# Patient Record
Sex: FEMALE | Race: WHITE
[De-identification: names, ages, dates, MRNs, and addresses within clinical notes are randomized per-mention and may not be internally consistent; named-entity substitution may affect disease eponyms.]

---

## 2020-03-25 ENCOUNTER — HOSPITAL ENCOUNTER (INPATIENT)
Dept: HOSPITAL 97 - L&D | Age: 30
LOS: 2 days | Discharge: HOME | End: 2020-03-27
Attending: SPECIALIST | Admitting: SPECIALIST
Payer: COMMERCIAL

## 2020-03-25 VITALS — BODY MASS INDEX: 28.3 KG/M2

## 2020-03-25 DIAGNOSIS — D64.9: ICD-10-CM

## 2020-03-25 DIAGNOSIS — Z3A.38: ICD-10-CM

## 2020-03-25 LAB
HCT VFR BLD CALC: 29.9 % (ref 36–45)
LYMPHOCYTES # SPEC AUTO: 2.2 K/UL (ref 0.7–4.9)
METHAMPHET UR QL SCN: NEGATIVE
PMV BLD: 10.5 FL (ref 7.6–11.3)
RBC # BLD: 3.25 M/UL (ref 3.86–4.86)
THC SERPL-MCNC: POSITIVE NG/ML

## 2020-03-25 PROCEDURE — 85025 COMPLETE CBC W/AUTO DIFF WBC: CPT

## 2020-03-25 PROCEDURE — 86870 RBC ANTIBODY IDENTIFICATION: CPT

## 2020-03-25 PROCEDURE — 36415 COLL VENOUS BLD VENIPUNCTURE: CPT

## 2020-03-25 PROCEDURE — 86850 RBC ANTIBODY SCREEN: CPT

## 2020-03-25 PROCEDURE — 0KQM0ZZ REPAIR PERINEUM MUSCLE, OPEN APPROACH: ICD-10-PCS

## 2020-03-25 PROCEDURE — 85014 HEMATOCRIT: CPT

## 2020-03-25 PROCEDURE — 86901 BLOOD TYPING SEROLOGIC RH(D): CPT

## 2020-03-25 PROCEDURE — 80307 DRUG TEST PRSMV CHEM ANLYZR: CPT

## 2020-03-25 PROCEDURE — 86592 SYPHILIS TEST NON-TREP QUAL: CPT

## 2020-03-25 PROCEDURE — 87340 HEPATITIS B SURFACE AG IA: CPT

## 2020-03-25 RX ADMIN — IBUPROFEN PRN MG: 600 TABLET ORAL at 20:30

## 2020-03-25 RX ADMIN — IBUPROFEN PRN MG: 600 TABLET ORAL at 13:45
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
## 2020-03-25 NOTE — XMS REPORT
Summary of Care

 Created on:2020



Patient:Mellissa Tobar

Sex:Female

:1990

External Reference #:[id number]





Demographics







 Address  [address] .



   [place] [address]

 

 Mobile Phone  [phone]

 

 Home Phone  [phone]

 

 Phone  [phone]

 

 Email Address  [email or web address]

 

 Email Address  [email or web address]

 

 Preferred Language  English

 

 Marital Status  

 

 Baptism Affiliation  Unknown

 

 Race  White

 

 Ethnic Group  Not  or 









Author







 Organization  [facility]

 

 Address  [address] [place] [address]









Support







 Name  Relationship  Address  Phone

 

 Gerhard Tobar  Unavailable  [address]   [phone]



     [place] [address]  

 

 Cruz Contreras  Unavailable  [address]  [phone]



     [place] [address]  









Care Team Providers







 Name  Role  Phone

 

 Doctor Unassigned, No Name  Medicaid Hmo  Unavailable

 

 Ernestina, [facility]ish [facility]  Unavailable  Unavailable

 

 Jayy Price [facility]  Unavailable  [phone]

 

 Ami Alcala  Primary Care Provider  [phone]









Reason for Visit







 Reason  Comments

 

 Prescription  







Encounter Details







 Date  Type  Department  Care Team  Description

 

 2020  Telephone  [facility]



  Prescription



     [address]



  [address]



  



     [place] [address]



  [facility]



  



     [phone]  [place] [address] [phone] (Fax)  







Allergies

No Known Allergiesdocumented as of this encounter (statuses as of 2020)



Medications







 Medication  Sig  Dispensed  Refills  Start Date  End Date  Status

 

 PNV 67-iron ps-folate  Take 1 capsule by  30 capsule  11  2019    Active



 no.1-dha (VITAFOL  mouth daily.          



 ULTRA) 29 mg iron- 1            



 mg-200 mg            



 CapIndications: High            



 risk pregnancy,            



 antepartum            

 

 proMETHazine 25 mg  Take 1 tablet by  30 tablet  3  2019    Active



 tabletIndications:  mouth every 4          



 Nausea/vomiting in  (four) hours as          



 pregnancy  needed for Nausea          



   and Vomiting          



   (N/V).          

 

 HYDROXYprogest,PF,,pre      0  2019    Active



 g presv, 250 mg/mL (1            



 mL) injection            









 Hospital, Clinic, or Other  Ordered Dose  Route  Frequency  Start Date  End 
Date  Status



 Facility Administered            



 Medication            

 

 HYDROXYprogest(PF)(preg  250 mg  IM  QWEEKLY  2019  Active



 presv) (JAYNA) 250 mg/mL            



 (1 mL) injection 250 mg            



documented as of this encounter (statuses as of 2020)



Active Problems







 Problem  Noted Date

 

 Rh negative, antepartum  10/23/2019

 

 History of fetal anomaly in prior pregnancy, currently pregnant  08/15/2019

 

 High risk pregnancy, antepartum  2017

 

 Current pregnancy with history of pre-term labor in second trimester  2017

 

 H/O cervical incompetence  2017

 

 Recurrent pregnancy loss, antepartum condition or complication  2017

 

 History of PID  2017

 

 Fetal demise, less than 22 weeks  2014









 Pregnant  Estimated Date of Delivery  Comments

 

 Yes  2020  Based on last menstrual period of 2019



     (Approximate)



documented as of this encounter (statuses as of 2020)



Resolved Problems







 Problem  Noted Date  Resolved Date

 

 H/O  delivery, currently pregnant, second trimester  10/10/2019  12/30/
2019

 

 Supervision of pregnancy with other poor reproductive or  2017  08/15/
2019



 obstetric history, third trimester    

 

 Preg care for patient w recurrent preg loss, third trimester  2017  08/15
/2019

 

 Suprvsn of preg w history of pre-term labor, third trimester  2017  08/15
/2019

 

 Rh negative state in antepartum period  2017  08/15/2019

 

 Rh negative state in antepartum period, first trimester  2017

 

 Well woman exam with routine gynecological exam  2017

 

 Screening for STD (sexually transmitted disease)  2017

 

 History of anemia  2017  08/15/2019

 

 Other general counseling and advice for contraceptive  2017



 management    

 

 Threatened  in early pregnancy  2015

 

 Unsure of last menstrual period as reason for ultrasound  2015



 scan    

 

 History of cervical incompetence in pregnancy, currently  2015



 pregnant, first trimester    

 

 Rh negative state in antepartum period, first trimester,  2015



 fetus 1    

 

 Pregnancy with other poor reproductive history  07/10/2014  2017

 

 High-risk pregnancy  07/10/2014  2017

 

 History of  delivery, currently pregnant  2014









 Overview: 







 Delivered at 21 weeks









 Rh negative status during pregnancy, antepartum  2014



documented as of this encounter (statuses as of 2020)



Immunizations







 Name  Administration Dates  Next Due

 

 Rho (d) Immune Globulin  2020, 2019, 08/10/2017  

 

 TDAP (ADACEL) VACCINE  2020  

 

 Td  2010  



documented as of this encounter



Social History







 Tobacco Use  Types  Packs/Day  Years Used  Date

 

 Former Smoker  Cigarettes  0.1  1  Quit: 2014









 Smokeless Tobacco: Never Used      









 Alcohol Use  Drinks/Week  oz/Week  Comments

 

 No  0 Standard drinks or equivalent  0.0  









 Pregnant  Estimated Date of Delivery  Comments

 

 Yes  2020  Based on last menstrual period of 2019



     (Approximate)









 Sex Assigned at Birth  Date Recorded

 

 Not on file  









 Job Start Date  Occupation  Industry

 

 Not on file  Not on file  Not on file









 Travel History  Travel Start  Travel End









 No recent travel history available.



documented as of this encounter



Last Filed Vital Signs

Not on filedocumented in this encounter



Plan of Treatment







 Date  Type  Specialty  Care Team  Description

 

 2020  Routine Prenatal Visit  [facility]  Ami Alcala, FNP



  



       [address]



  



       [place] [address]



  



       [phone] (Fax)  









 Health Maintenance  Due Date  Last Done  Comments

 

 INFLUENZA VACCINE (#1)  2020    Postponed from



       2019 (Refused)

 

 PAP SMEAR  08/15/2022  08/15/2019, 2017,  



     2014, Additional  



     history exists  

 

 DTaP,Tdap,and Td Vaccines  2030, 2010  



 (2 - Td)      

 

 PNEUMOCOCCAL 0-64 YEARS  Aged Out    No longer eligible



 COMBINED SERIES      based on patient's age



       to complete this topic



documented as of this encounter



Results

Not on filedocumented in this encounter



Insurance







 Payer  Benefit Plan /  Subscriber ID  Effective  Phone  Address  Type



   Group    [facility]  xxxxxxxxx  10/1/2019-Pres    [address]  Medicaid



 HEALTH CHOICE -  HEALTH CHOICE    ent    [id number]



  



 MANAGED  MEDICAID        [place]          [id number]  



documented as of this encounter



Advance Directives







 Name  Relationship  Healthcare Agent Relationship  Communication

 

 Gerhard Harrell  Primary healthcare agent  [phone]



       (Mobile)[phone] (Home)

## 2020-03-25 NOTE — XMS REPORT
Summary of Care

 Created on:2019



Patient:Mellissa Tobar

Sex:Female

:1990

External Reference #:[id number]





Demographics







 Address  [address] .



   [place] [address]

 

 Mobile Phone  [phone]

 

 Home Phone  [phone]

 

 Phone  [phone]

 

 Email Address  [email or web address]

 

 Email Address  [email or web address]

 

 Preferred Language  English

 

 Marital Status  

 

 Nondenominational Affiliation  Unknown

 

 Race  White

 

 Ethnic Group  Not  or 









Author







 Organization  [facility]

 

 Address  [address] [place] [address]









Support







 Name  Relationship  Address  Phone

 

 Gerhard Tobar  Unavailable  [address]   [phone]



     [place] [address]  

 

 Cruz Contreras  Unavailable  [address]  [phone]



     [place] [address]  









Care Team Providers







 Name  Role  Phone

 

 Doctor Unassigned, No Name  Medicaid Hmo  Unavailable

 

 Faculty, Ashish [facility]  Unavailable  Unavailable

 

 Jayy Price FNP  Unavailable  [phone]

 

 Ami Alcala  Primary Care Provider  [phone]









Reason for Visit







 Reason  Comments

 

 PREGNANCY ULTRASOUND  



 (Routine)





 Status  Reason  Specialty  Diagnoses /  Referred By  Referred To



       Procedures  Contact  Contact

 

 Authorized    Maternal Fetal  Diagnoses



High risk pregnancy, antepartum  Ami Alcala  



     Medicine  



Procedures



CONSULT MATERNAL FETAL MEDICINE ULTRASOUND Preferred Location: [facility]



  



         [address], [place]  



         [address]



  



         Phone:  



         [phone]



  



         Fax:  



         [phone]  









Encounter Details







 Date  Type  Department  Care Team  Description

 

 2019  Technician Visit  [facility]  KaylaTessie MD  
Uterine size-date



     [facility]



  



Mariella Gamboa MD



[id number]



[place] [address] [phone] (Fax)  discrepancy in first



     [facility]



  



5, [facility] Room  trimester



     [address] [facility] 3rd Floor



    



     [place]    



     [address] [phone]    







Allergies

No Known Allergiesdocumented as of this encounter (statuses as of 2019)



Medications







 Medication  Sig  Dispensed  Refills  Start Date  End Date  Status

 

 proMETHazine 25 mg  Take 1 tablet by  30 tablet  3  2019    Active



 tabletIndications:  mouth every 4          



 Nausea/vomiting in  (four) hours as          



 pregnancy  needed for          



   Nausea and          



   Vomiting (N/V).          

 

 PNV 67-iron ps-folate  Take 1 capsule  30 capsule  11  2019    Active



 no.1-dha (VITAFOL  by mouth daily.          



 ULTRA) 29 mg iron- 1            



 mg-200 mg            



 CapIndications: High            



 risk pregnancy,            



 antepartum            

 

 amoxicillin 500 mg  Take 1 tablet by  30 tablet  0  2019  
Active



 tabletIndications:  mouth 3 (three)          



 Abdominal pain  times daily for          



 affecting pregnancy,  10 days.          



 Urinary tract            



 infection without            



 hematuria, site            



 unspecified            



documented as of this encounter (statuses as of 2019)



Active Problems







 Problem  Noted Date

 

 History of fetal anomaly in prior pregnancy, currently pregnant  08/15/2019

 

 High risk pregnancy, antepartum  2017

 

 Current pregnancy with history of pre-term labor in first trimester  2017

 

 Rh negative state in antepartum period, first trimester  2017

 

 H/O cervical incompetence  2017

 

 Recurrent pregnancy loss, antepartum condition or complication  2017

 

 History of PID  2017

 

 Fetal demise, less than 22 weeks  2014









 Pregnant  Estimated Date of Delivery  Comments

 

 Yes  2020  Based on last menstrual period of 2019



     (Approximate)



documented as of this encounter (statuses as of 2019)



Resolved Problems







 Problem  Noted Date  Resolved Date

 

 Supervision of pregnancy with other poor reproductive or  2017  08/15/
2019



 obstetric history, third trimester    

 

 Preg care for patient w recurrent preg loss, third trimester  2017  08/15
/2019

 

 Suprvsn of preg w history of pre-term labor, third trimester  2017  08/15
/2019

 

 Rh negative state in antepartum period  2017  08/15/2019

 

 Well woman exam with routine gynecological exam  2017

 

 Screening for STD (sexually transmitted disease)  2017

 

 History of anemia  2017  08/15/2019

 

 Other general counseling and advice for contraceptive  2017



 management    

 

 Threatened  in early pregnancy  2015

 

 Unsure of last menstrual period as reason for ultrasound  2015



 scan    

 

 History of cervical incompetence in pregnancy, currently  2015



 pregnant, first trimester    

 

 Rh negative state in antepartum period, first trimester,  2015



 fetus 1    

 

 Pregnancy with other poor reproductive history  07/10/2014  2017

 

 High-risk pregnancy  07/10/2014  2017

 

 History of  delivery, currently pregnant  2014









 Overview: 







 Delivered at 21 weeks









 Rh negative status during pregnancy, antepartum  2014



documented as of this encounter (statuses as of 2019)



Immunizations







 Name  Administration Dates  Next Due

 

 Rho (d) Immune Globulin  08/10/2017  

 

 Td  2010  



documented as of this encounter



Social History







 Tobacco Use  Types  Packs/Day  Years Used  Date

 

 Former Smoker  Cigarettes  0.1  1  Quit: 2014









 Smokeless Tobacco: Never Used      









 Alcohol Use  Drinks/Week  oz/Week  Comments

 

 No  0 Standard drinks or equivalent  0.0  









 Pregnant  Estimated Date of Delivery  Comments

 

 Yes  2020  Based on last menstrual period of 2019



     (Approximate)









 Sex Assigned at Birth  Date Recorded

 

 Not on file  









 Job Start Date  Occupation  Industry

 

 Not on file  Not on file  Not on file









 Travel History  Travel Start  Travel End









 No recent travel history available.



documented as of this encounter



Last Filed Vital Signs

Not on filedocumented in this encounter



Plan of Treatment







 Date  Type  Specialty  Care Team  Description

 

 2019  Routine Prenatal Visit  [facility]  Ami Alcala, FNP



  



       [address]



  



       [facility]



  



       [place] [address]



  



       [phone] (Fax)  









 Health Maintenance  Due Date  Last Done  Comments

 

 INFLUENZA VACCINE (#1)  2019    

 

 DTaP,Tdap,and Td Vaccines  2019  Postponed from



 (1 - Tdap)      2010 (Pregnant or



       Breastfeeding)

 

 PAP SMEAR  08/15/2022  08/15/2019, 2017,  



     2014, Additional  



     history exists  

 

 PNEUMOCOCCAL 0-64 YEARS  Aged Out    No longer eligible



 COMBINED SERIES      based on patient's age



       to complete this topic



documented as of this encounter



Results

Not on filedocumented in this encounter



Visit Diagnoses







 Diagnosis

 

 Uterine size-date discrepancy in first trimester







 Uterine size date discrepancy, antepartum condition or complication



documented in this encounter



Insurance







 Payer  Benefit Plan /  Subscriber ID  Effective Dates  Phone  Address  Type



   Group          

 

 [employer] [place] [place]  xxxxxxxxx  2019-Present  [phone]  [address]  Medicaid



   [place]        [id number] [place] [address]  



           [address]  









 Guarantor Name  Account Type  Relation to  Date of  Phone  Billing



     Patient  Birth    Address

 

 Vasquez Tobarhanie  Personal/Family  Self  1990  [phone]  [address] [place] [address] [address]        ([place])  [place] [address]



documented as of this encounter



Advance Directives







 Name  Relationship  Healthcare Agent Relationship  Communication

 

 Gerhard Harrell  Primary healthcare agent  [phone]



       (Mobile)[phone] (Home)

## 2020-03-25 NOTE — XMS REPORT
Summary of Care

 Created on:2020



Patient:Mellissa Tobar

Sex:Female

:1990

External Reference #:[id number]





Demographics







 Address  [address] .



   [place] [address]

 

 Mobile Phone  [phone]

 

 Home Phone  [phone]

 

 Phone  [phone]

 

 Email Address  [email or web address]

 

 Email Address  [email or web address]

 

 Preferred Language  English

 

 Marital Status  

 

 Tenriism Affiliation  Unknown

 

 Race  White

 

 Ethnic Group  Not  or 









Author







 Organization  [facility]

 

 Address  [address] [place] [address]









Support







 Name  Relationship  Address  Phone

 

 Gerhard Tobar  Unavailable  [address]   [phone]



     [place] [address]  

 

 Cruz Contreras  Unavailable  [address]  [phone]



     [place] [address]  









Care Team Providers







 Name  Role  Phone

 

 Doctor Unassigned, No Name  Medicaid Hmo  Unavailable

 

 Ernestina, Ashish [facility]  Unavailable  Unavailable

 

 Jayy Price FNP  Unavailable  [phone]

 

 Ami Alcala FNP  Primary Care Provider  [phone]









Reason for Visit







 Reason  Comments

 

 Referral/consult  







Encounter Details







 Date  Type  Department  Care Team  Description

 

 2020  Telephone  [facility]  Ami Alcala, FNP



  Referral/consult



     [place]



  [address]



  



     [address] [facility]



  [address]



  



     [place] [address]



  [place], [place] [address] [phone] (Fax)  







Allergies

No Known Allergiesdocumented as of this encounter (statuses as of 2020)



Medications







 Medication  Sig  Dispensed  Refills  Start Date  End Date  Status

 

 PNV 67-iron ps-folate  Take 1 capsule by  30 capsule  11  2019    Active



 no.1-dha (VITAFOL  mouth daily.          



 ULTRA) 29 mg iron- 1            



 mg-200 mg            



 CapIndications: High            



 risk pregnancy,            



 antepartum            

 

 proMETHazine 25 mg  Take 1 tablet by  30 tablet  3  2019    Active



 tabletIndications:  mouth every 4          



 Nausea/vomiting in  (four) hours as          



 pregnancy  needed for Nausea          



   and Vomiting          



   (N/V).          

 

 HYDROXYprogest,PF,,pre      0  2019    Active



 g presv, 250 mg/mL (1            



 mL) injection            









 Hospital, Clinic, or Other  Ordered Dose  Route  Frequency  Start Date  End 
Date  Status



 Facility Administered            



 Medication            

 

 HYDROXYprogest(PF)(preg  250 mg  IM  QWEEKLY  2019  Active



 presv) (JAYNA) 250 mg/mL            



 (1 mL) injection 250 mg            



documented as of this encounter (statuses as of 2020)



Active Problems







 Problem  Noted Date

 

 Rh negative, antepartum  10/23/2019

 

 History of fetal anomaly in prior pregnancy, currently pregnant  08/15/2019

 

 High risk pregnancy, antepartum  2017

 

 Current pregnancy with history of pre-term labor in third trimester  2017

 

 H/O cervical incompetence  2017

 

 Recurrent pregnancy loss, antepartum condition or complication  2017

 

 History of PID  2017

 

 Fetal demise, less than 22 weeks  2014









 Pregnant  Estimated Date of Delivery  Comments

 

 Yes  2020  Based on last menstrual period of 2019



     (Approximate)



documented as of this encounter (statuses as of 2020)



Resolved Problems







 Problem  Noted Date  Resolved Date

 

 H/O  delivery, currently pregnant, second trimester  10/10/2019  12/30/
2019

 

 Supervision of pregnancy with other poor reproductive or  2017  08/15/
2019



 obstetric history, third trimester    

 

 Preg care for patient w recurrent preg loss, third trimester  2017  08/15
/2019

 

 Suprvsn of preg w history of pre-term labor, third trimester  2017  08/15
/2019

 

 Rh negative state in antepartum period  2017  08/15/2019

 

 Rh negative state in antepartum period, first trimester  2017

 

 Well woman exam with routine gynecological exam  2017

 

 Screening for STD (sexually transmitted disease)  2017

 

 History of anemia  2017  08/15/2019

 

 Other general counseling and advice for contraceptive  2017



 management    

 

 Threatened  in early pregnancy  2015

 

 Unsure of last menstrual period as reason for ultrasound  2015



 scan    

 

 History of cervical incompetence in pregnancy, currently  2015



 pregnant, first trimester    

 

 Rh negative state in antepartum period, first trimester,  2015



 fetus 1    

 

 Pregnancy with other poor reproductive history  07/10/2014  2017

 

 High-risk pregnancy  07/10/2014  2017

 

 History of  delivery, currently pregnant  2014









 Overview: 







 Delivered at 21 weeks









 Rh negative status during pregnancy, antepartum  2014



documented as of this encounter (statuses as of 2020)



Immunizations







 Name  Administration Dates  Next Due

 

 Rho (d) Immune Globulin  2020, 2019, 08/10/2017  

 

 TDAP (ADACEL) VACCINE  2020  

 

 Td  2010  



documented as of this encounter



Social History







 Tobacco Use  Types  Packs/Day  Years Used  Date

 

 Former Smoker  Cigarettes  0.1  1  Quit: 2014









 Smokeless Tobacco: Never Used      









 Alcohol Use  Drinks/Week  oz/Week  Comments

 

 No  0 Standard drinks or equivalent  0.0  









 Pregnant  Estimated Date of Delivery  Comments

 

 Yes  2020  Based on last menstrual period of 2019



     (Approximate)









 Sex Assigned at Birth  Date Recorded

 

 Not on file  









 Job Start Date  Occupation  Industry

 

 Not on file  Not on file  Not on file









 Travel History  Travel Start  Travel End









 No recent travel history available.



documented as of this encounter



Last Filed Vital Signs

Not on filedocumented in this encounter



Plan of Treatment







 Date  Type  Specialty  Care Team  Description

 

 2020  Routine Prenatal Visit  [facility]  Ami Alcala, FNP



  



       [address]



  



       [facility]



  



       [place] [address]



  



       [phone] (Fax)  

 

 2020  Nurse Visit  [facility]  Visit, [facility] Nurse  









 Health Maintenance  Due Date  Last Done  Comments

 

 INFLUENZA VACCINE (#1)  2020    Postponed from



       2019 (Refused)

 

 PAP SMEAR  08/15/2022  08/15/2019, 2017,  



     2014, Additional  



     history exists  

 

 DTaP,Tdap,and Td Vaccines  2030, 2010  



 (2 - Td)      

 

 PNEUMOCOCCAL 0-64 YEARS  Aged Out    No longer eligible



 COMBINED SERIES      based on patient's age



       to complete this topic



documented as of this encounter



Results

Not on filedocumented in this encounter



Insurance







 Payer  Benefit Plan /  Subscriber ID  Effective  Phone  Address  Type



   Group    Dates      

 

 [facility]  xxxxxxxxx  10/1/2019-Pres    [address]  Medicaid



 HEALTH CHOICE -  HEALTH CHOICE    [facility]    [id number]



  



 [facility]  MEDICAID        [place]          [id number]  



documented as of this encounter



Advance Directives







 Name  Relationship  Healthcare Agent Relationship  Communication

 

 Gerhard Harrell  Primary healthcare agent  [phone]



       (Mobile)[phone] (Home)

## 2020-03-25 NOTE — P.PN
Date of Service: 03/25/20





[facility] pt presents with ROM, now 4cm/100%/minus one station. Plan epidural 

placement, prepare for delivery.

## 2020-03-25 NOTE — XMS REPORT
Summary of Care

 Created on:2020



Patient:Mellissa Tobar

Sex:Female

:1990

External Reference #:[id number]





Demographics







 Address  [address]



   [place] [address]

 

 Mobile Phone  [phone]

 

 Home Phone  [phone]

 

 Phone  [phone]

 

 Email Address  [email or web address]

 

 Email Address  [email or web address]

 

 Preferred Language  English

 

 Marital Status  

 

 Sabianism Affiliation  Unknown

 

 Race  White

 

 Ethnic Group  Not  or 









Author







 Organization  [facility]

 

 Address  [address] [place] [address]









Support







 Name  Relationship  Address  Phone

 

 Gerhard Tobar  Unavailable  [address]  [phone]



     [place] [address]  

 

 Cruz Contreras  Unavailable  [address]  [phone]



     [place] [address]  









Care Team Providers







 Name  Role  Phone

 

 Doctor Unassigned, No Name  Medicaid Hmo  Unavailable

 

 Ernest[facility]  Unavailable  Unavailable

 

 Jayy Price FNP  Unavailable  [phone]

 

 Ami Alcala  Primary Care Provider  [phone]









Reason for Visit







 Reason  Comments

 

 Prenatal Care  







Encounter Details







 Date  Type  Department  Care Team  Description

 

 2020  Routine Prenatal  [facility]-  Ami Alcala  High-risk 
pregnancy in third trimester (Primary Dx);



   Visit  MAGDALENA Watts



  Rh negative, antepartum;



     [address]



  [address]



  Current pregnancy with history of pre-term labor in third trimester



     [place], [place]  Julito A



  



     [address]



  [place]  



     [phone] [address] [phone]  



       (Fax)  







Allergies

No Known Allergiesdocumented as of this encounter (statuses as of 2020)



Medications







 Medication  Sig  Dispensed  Refills  Start Date  End Date  Status

 

 PNV 67-iron ps-folate  Take 1 capsule by  30 capsule  11  2019    Active



 no.1-dha (VITAFOL  mouth daily.          



 ULTRA) 29 mg iron- 1            



 mg-200 mg            



 CapIndications: High            



 risk pregnancy,            



 antepartum            

 

 proMETHazine 25 mg  Take 1 tablet by  30 tablet  3  2019    Active



 tabletIndications:  mouth every 4          



 Nausea/vomiting in  (four) hours as          



 pregnancy  needed for Nausea          



   and Vomiting          



   (N/V).          

 

 HYDROXYprogest,PF,,pre      0  2019    Active



 g presv, 250 mg/mL (1            



 mL) injection            









 Hospital, Clinic, or Other  Ordered Dose  Route  Frequency  Start Date  End 
Date  Status



 Facility Administered            



 Medication            

 

 HYDROXYprogest(PF)(preg  250 mg  IM  QWEEKLY  2019  Active



 presv) (STEFAN) 250 mg/mL            



 (1 mL) injection 250 mg            



documented as of this encounter (statuses as of 2020)



Active Problems







 Problem  Noted Date

 

 Rh negative, antepartum  10/23/2019

 

 History of fetal anomaly in prior pregnancy, currently pregnant  08/15/2019

 

 High risk pregnancy, antepartum  2017

 

 Current pregnancy with history of pre-term labor in third trimester  2017

 

 H/O cervical incompetence  2017

 

 Recurrent pregnancy loss, antepartum condition or complication  2017

 

 History of PID  2017

 

 Fetal demise, less than 22 weeks  2014









 Pregnant  Estimated Date of Delivery  Comments

 

 Yes  2020  Based on last menstrual period of 2019



     (Approximate)



documented as of this encounter (statuses as of 2020)



Resolved Problems







 Problem  Noted Date  Resolved Date

 

 H/O  delivery, currently pregnant, second trimester  10/10/2019  12/30/
2019

 

 Supervision of pregnancy with other poor reproductive or  2017  08/15/
2019



 obstetric history, third trimester    

 

 Preg care for patient w recurrent preg loss, third trimester  2017  08/15
/2019

 

 Suprvsn of preg w history of pre-term labor, third trimester  2017  08/15
/2019

 

 Rh negative state in antepartum period  2017  08/15/2019

 

 Rh negative state in antepartum period, first trimester  2017

 

 Well woman exam with routine gynecological exam  2017

 

 Screening for STD (sexually transmitted disease)  2017

 

 History of anemia  2017  08/15/2019

 

 Other general counseling and advice for contraceptive  2017



 management    

 

 Threatened  in early pregnancy  2015

 

 Unsure of last menstrual period as reason for ultrasound  2015



 scan    

 

 History of cervical incompetence in pregnancy, currently  2015



 pregnant, first trimester    

 

 Rh negative state in antepartum period, first trimester,  2015



 fetus 1    

 

 Pregnancy with other poor reproductive history  07/10/2014  2017

 

 High-risk pregnancy  07/10/2014  2017

 

 History of  delivery, currently pregnant  2014









 Overview: 







 Delivered at 21 weeks









 Rh negative status during pregnancy, antepartum  2014



documented as of this encounter (statuses as of 2020)



Immunizations







 Name  Administration Dates  Next Due

 

 Rho (d) Immune Globulin  2020, 2019, 08/10/2017  

 

 TDAP (ADACEL) VACCINE  2020  

 

 Td  2010  



documented as of this encounter



Social History







 Tobacco Use  Types  Packs/Day  Years Used  Date

 

 Former Smoker  Cigarettes  0.1  1  Quit: 2014









 Smokeless Tobacco: Never Used      









 Alcohol Use  Drinks/Week  oz/Week  Comments

 

 No  0 Standard drinks or equivalent  0.0  









 Pregnant  Estimated Date of Delivery  Comments

 

 Yes  2020  Based on last menstrual period of 2019



     (Approximate)









 Sex Assigned at Birth  Date Recorded

 

 Not on file  









 Job Start Date  Occupation  Industry

 

 Not on file  Not on file  Not on file









 Travel History  Travel Start  Travel End









 No recent travel history available.



documented as of this encounter



Last Filed Vital Signs







 Vital Sign  Reading  Time Taken  Comments

 

 Blood Pressure  111/68  2020  9:08 AM CST  

 

 Pulse  86  2020  9:08 AM CST  

 

 Temperature  36.7 C (98.1 F)  2020  9:08 AM CST  

 

 Respiratory Rate  16  2020  9:08 AM CST  

 

 Oxygen Saturation  -  -  

 

 Inhaled Oxygen Concentration  -  -  

 

 Weight  75.1 kg (165 lb 8 oz)  2020  9:08 AM CST  

 

 Height  165.1 cm (5' 5")  2020  9:08 AM CST  

 

 Body Mass Index  27.54  2020  9:08 AM CST  



documented in this encounter



Patient Instructions

Patient InstructionsLissy Oleary LVN - 2020  8:30 AM CST

Hydroxyprogesterone solution for injection

Brand Name: Stefan

What is this medicine?

HYDROXYPROGESTERONE (wily drox ee proe ZELALEM ter one) is a female hormone. This 
medicine is used in women who are pregnant and who have delivered a baby too 
early () in the past. It helps lower therisk of having a  baby 
again. This medicine is also used to treat irregular menstrual bleeding or a 
lack of menstrual bleeding in women. In some cases, it may be used to treat 
endometrial cancer.

How should I use this medicine?

This medicine is for injection into a muscle or under the skin. It is given by 
a health care professional in a hospital or clinic setting.

Talk to your pediatrician regarding the use of this medicine in children. 
Special care may be needed.

What side effects may I notice from receiving this medicine?

Side effects that you should report to your doctor or health care professional 
as soon as possible:

 allergic reactions like skin rash, itching or hives, swelling of the face, 
lips, or tongue

 breathing problems

 depressed mood

 increase in blood pressure

 increased hunger or thirst

 increased urination

 signs and symptoms of a blood clot such as breathing problems; changes in 
vision; chest pain; severe, sudden headache; pain, swelling, warmth in the leg; 
trouble speaking; sudden numbness or weakness of the face, arm or leg

 unusually weak or tired

 unusual vaginal bleeding

 yellowing of the eyes or skin

Side effects that usually do not require medical attention (report to your 
doctor or health care professional if they continue or are bothersome):

 diarrhea

 fluid retention and swelling

 nausea

 pain, redness, or irritation at site where injected

What may interact with this medicine?

Significant interactions are not expected.

What if I miss a dose?

It is important not to miss your dose. Call your doctor or health care 
professional if you are unable to keep an appointment.

Where should I keep my medicine?

This drug is given in a hospital or clinic and will not be stored at home.

What should I tell my health care provider before I take this medicine?

They need to know if you have any of these conditions:

 breast, cervical, uterine, or vaginal cancer

 depression

 diabetes or prediabetes

 heart disease

 high blood pressure

 history of blood clots

 kidney disease

 liver disease

 lung or breathing disease, like asthma

 migraine headaches

 seizures

 vaginal bleeding

 an unusual or allergic reaction to hydroxyprogesterone, other hormones, 
castor oil, other medicines, foods, dyes, or preservatives

 breast-feeding

What should I watch for while using this medicine?

Your condition will be monitored carefully while you are receiving this 
medicine.

NOTE:This sheet is a summary. It may not cover all possible information. If you 
have questions aboutthis medicine, talk to your doctor, pharmacist, or health 
care provider. Copyright 2018 Elsevier



Electronically signed by Lissy Oleary LVN at 2020  9:26 AM CST

documented in this encounter



Progress Notes

Lissy Oleary LVN - 2020  8:30 AM CST30 year old female in clinic 
today for [place] progesterone injection. 250 mg administered IM to right gluteus
- see MAR entry. Witnessed by YUE Vasquez RN.  Medication supplied by 
outside pharmacy.  Pt tolerated injection well, warning signs discussed with 
her at this time. Pt instructed to RTC 1 wkfor next dose or PRN. Pt verbalized 
understanding.

Lissy Golden LVN  2020  9:26 AM



Electronically signed by Lissy Oleary LVN at 2020  9:27 AM Ami Hall FNP - 2020  8:30 AM CSTFormatting of this note might be 
different from the original.

Chief complaint:

Chief Complaint

Patient presents with

 Prenatal Care



HPI



Mellissa Tobar is a 30 year old female is a 

@ w4d here for prenatal visit. Patient's last menstrual period was 2019 
(approximate). Estimated Date of Delivery: 20



Today she denies any complaints or concerns.



She is taking PNV. She reports good FM. She denies any ctx/cramping, VB, LOF, HA
, visual disturbance, vaginal discharge or dysuria. She denies any foreign 
travel. She also denies any physical, sexual or emotional abuse.



Histories

OB History

 Para Term  AB Living

6 3 0 2 2 1

SAB TAB Ectopic Multiple Live Births

1 1 0 0 2



# Outcome Date GA Lbr Damon/2nd Weight Sex Delivery Anes PTL Lv

6 Current

5  17 31w6d  3 lb 11 oz (1.673 kg) F NORMAL SPONT   DEANGELO

4 SAB 07/01/15 11w0d

3 Para 14 17w0d    Vag-Spont   FD

2 TAB 08 11w0d

1  05 21w0d  1 lb 2 oz (0.51 kg) M NORMAL SPONT  Y ND

   Birth Comments: Incompentent cervix



Obstetric Comments

 (different partner)



,,, current pregnacy (current partner)



Past Medical History:

Diagnosis Date

 Anemia 

 resolved, related to pregnancy

 Anxiety

 self reported-no tx

 History of anemia 2017

 Menstrual disorder 2013

 irregular, heavy, and painful

 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 
2014

 Screening for STD (sexually transmitted disease) 2017



Family History

Problem Relation Age of Onset

 Other - see comments Mother

     cervical CA

 Ovarian Cancer Maternal Grandmother

 Breast Cancer Maternal Grandmother

 Arthritis Father

 Hypertension Father

 No Significant Medical Problems Sister

 Breast Cancer Paternal Grandmother

 Asthma NoFHx

 Birth defects NoFHx

 Colon Cancer NoFHx

 Cancer NoFHx

 Depression NoFHx

 Diabetes NoFHx

 Genetic NoFHx

 Heart NoFHx

 High cholesterol NoFHx

 Mental retardation NoFHx

 Neurological NoFHx

 Osteoporosis NoFHx

 Psychiatry NoFHx



Family Status

Relation Name Status

 Mo  Alive

 MGMo  Alive

 Fa  Alive

 Sis  Alive

 MAunt  Alive

 MUnc  Alive

 PAunt  Alive

 PUnc  Alive

 MGFa  Alive

 PGMo  Alive

 PGFa  Alive

 NoFHx  (Not Specified)



Past Surgical History:

Procedure Laterality Date

 DILATION AND CURETTAGE (SHX)  2008



Social History



Socioeconomic History

 Marital status: 

  Spouse name: Not on file

 Number of children: 0

 Years of education: 15

 Highest education level: Not on file

Occupational History

 Occupation: Unemployed

Social Needs

 Financial resource strain: Not on file

 Food insecurity:

  Worry: Not on file

  Inability: Not on file

 Transportation needs:

  Medical: Not on file

  Non-medical: Not on file

Tobacco Use

 Smoking status: Former Smoker

  Packs/day: 0.10

  Years: 1.00

  Pack years: 0.10

  Types: Cigarettes

  Last attempt to quit: 2014

  Years since quittin.7

 Smokeless tobacco: Never Used

Substance and Sexual Activity

 Alcohol use: No

  Alcohol/week: 0.0 standard drinks

 Drug use: No

 Sexual activity: Yes

  Partners: Male

  Birth control/protection: None

  Comment: last sexual intercourse 2019

Lifestyle

 Physical activity:

  Days per week: Not on file

  Minutes per session: Not on file

 Stress: Not on file

Relationships

 Social connections:

  Talks on phone: Not on file

  Gets together: Not on file

  Attends Church service: Not on file

  Active member of club or organization: Not on file

  Attends meetings of clubs or organizations: Not on file

  Relationship status: Not on file

 Intimate partner violence:

  Fear of current or ex partner: Not on file

  Emotionally abused: Not on file

  Physically abused: Not on file

  Forced sexual activity: Not on file

Other Topics Concern

 Not on file

Social History Narrative

 Denies domestic violence or abuse

 No exposure to cats

 No Church preference

 Patient lives with father and child.



Social History



Substance and Sexual Activity

Sexual Activity Yes

 Partners: Male

 Birth control/protection: None

 Comment: last sexual intercourse 2019







Labs

No new labs and I have reviewed the patient's labs.



Radiology

No new radiology.



Allergies

Mellissa has No Known Allergies.



Medications

Mellissa has a current medication list which includes the following 
prescription(s): hydroxyprogest(pf)(preg presv), promethazine, and pnv 67-iron 
ps-folate no.1-dha, and the following Facility-Administered Medications: 
hydroxyprogest(pf)(preg presv).



Review of Systems

Constitutional: Negative for appetite change, fatigue and fever.

Eyes: Negative for visual disturbance.

Respiratory: Negative.

Cardiovascular: Negative for palpitations and leg swelling.

Gastrointestinal: Negative for abdominal pain, constipation, diarrhea, nausea 
and vomiting.

Genitourinary: Negative.  Negative for dysuria, vaginal bleeding, vaginal 
discharge and pelvic pain.

Musculoskeletal: Negative.

Skin: Negative for rash.

Neurological: Negative for dizziness, light-headedness and headaches.

Psychiatric/Behavioral: Negative.



/68 (BP Location: Right arm, Patient Position: Sitting, BP CUFF SIZE: 
Adult Medium)  | Pulse 86  | Temp 36.7 C (98.1 F) (Oral)  | Resp 16  | Ht 5
' 5" (1.651 m)  | Wt 165 lb 8 oz (75.1 kg)  | LMP 2019 (Approximate)  | 
BMI 27.54 kg/m

Pregravid BMI: 24.6



Physical Exam

Vitals reviewed.

Constitutional: She is oriented to person, place, and time. She appears well-
developed and well-nourished.

See prenatal flowsheet

Cardiovascular: No peripheral edema present.

Pulmonary/Chest: Normal inspiratory effort.

Abdominal: Abdomen is soft.

Neuro/Psychiatric: She has a normal mood and affect. She is oriented to person, 
place, and time.

Skin: Skin normal.





Assessment/Plan



1. High-risk pregnancy in third trimester

32w4d

Labor precautions, FKC and PIH warnings reviewed.



- POCT URINALYSIS W/O SPECIFIC GRAVITY



2. Rh negative, antepartum

Rhogam given 20, repeat PP PRN



3. Current pregnancy with history of pre-term labor in third trimester

Weekly stefan injections until 36 weeks

Denies s/s PTL



Return to clinic in 1 weeks.

Reviewed patient instructions and provided printed copy.

Pregnancy at  32w4d



This visit did not involve counseling and coordination that comprised more than 
50% of the visit time.

Electronically signed by Ami Alcala FNP at 2020  9:59 AM 
CSTdocumented in this encounter



Plan of Treatment







 Date  Type  Specialty  Care Team  Description

 

 2020  Nurse Visit  [facility]  [facility]  

 

 2020  Routine Prenatal Visit  [facility]  Ami Alcala FNP



  



       [address], [place] [address] [phone] (Fax)  









 Health Maintenance  Due Date  Last Done  Comments

 

 INFLUENZA VACCINE (#1)  2020    Postponed from



       2019 (Refused)

 

 PAP SMEAR  08/15/2022  08/15/2019, 2017,  



     2014, Additional  



     history exists  

 

 DTaP,Tdap,and Td Vaccines  2030, 2010  



 (2 - Td)      

 

 PNEUMOCOCCAL 0-64 YEARS  Aged Out    No longer eligible



 COMBINED SERIES      based on patient's age



       to complete this topic



documented as of this encounter



Procedures







 Procedure Name  Priority  Date/Time  Associated  Comments



       Diagnosis  

 

 POCT URINALYSIS W/O  Routine  2020  9:12 AM  High-risk pregnancy  
Results for this



 SPECIFIC GRAVITY    CST  in third trimester  procedure are in



         the results



         section.

 

 POCT URINALYSIS W/O  Routine  2020  9:11 AM  High-risk pregnancy  
Results for this



 SPECIFIC GRAVITY    CST  in third trimester  procedure are in



         the results



         section.



documented in this encounter



Results

POCT URINALYSIS W/O SPECIFIC GRAVITY (2020  9:12 AM CST)





 Component  Value  Ref Range  Performed At  Pathologist Signature

 

 POCT PH U  .Comment: error  5 - 8 mg/dl    

 

 POCT U LEUK EST  .  Negative - Negative    

 

 POCT U NIT  .  Negative - Negative    

 

 POCT U PROT  .  Negative - Negative    

 

 POCT U GLU  .  Negative - Negative    

 

 POCT U KETONE  .  Negative - Negative    

 

 POCT U BLD  .  Negative - Negative    









 Specimen

 

 Urine - URINE, CLEAN CATCH



POCT URINALYSIS W/O SPECIFIC GRAVITY (2020  9:11 AM CST)





 Component  Value  Ref Range  Performed At  Pathologist Signature

 

 POCT PH U  .  5 - 8 mg/dl    

 

 POCT U LEUK EST  .  Negative - Negative    

 

 POCT U NIT  .  Negative - Negative    

 

 POCT U PROT  trace  Negative - Negative    

 

 POCT U GLU  neg  Negative - Negative    

 

 POCT U KETONE  .  Negative - Negative    

 

 POCT U BLD  .  Negative - Negative    









 Specimen

 

 Urine - URINE, CLEAN CATCH



documented in this encounter



Visit Diagnoses







 Diagnosis

 

 High-risk pregnancy in third trimester - Primary

 

 Rh negative, antepartum







 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

 

 Current pregnancy with history of pre-term labor in third trimester



documented in this encounter



Administered Medications







 Medication Order  MAR Action  Action Date  Dose  Rate  Site

 

 HYDROXYprogest(PF)(preg  Given  2020  9:27 AM  250 mg    Right Upper 
Quad.



 presv) (STEFAN) 250    CST      Gluteus



 mg/mL (1 mL) injection          



 250 mg



          



 250 mg, Intramuscular,          



 QWEEKLY, 14 doses,          



 First dose on 19 at 1200, Last          



 dose on Mon 3/2/20 at          



 1200, Routine          









 Given  2020 10:33 AM CST  250 mg    Left Upper Quad. Gluteus

 

 Given  2020  9:30 AM CST  250 mg    Right Dorsogluteal-IM









   



documented in this encounter



Insurance







 Payer  Benefit Plan /  Subscriber ID  Effective  Phone  Address  Type



   Group    [facility]  [facility]  10/1/2019-Pres    [address]  Medicaid



 HEALTH CHOICE -  HEALTH CHOICE    ent    [id number]



  



 MANAGED  MEDICAID        [place]          [id number]  









 Guarantor Name  Account Type  Relation to  Date of  Phone  Billing



     Patient  Birth    Address

 

 Mellissa Tobar  Personal/Family  Self  1990  [phone]  [address] [place] [address] [address] [place]        (Home)  [place] [address]



documented as of this encounter



Advance Directives







 Name  Relationship  Healthcare Agent Relationship  Communication

 

 Gerhard Harrell  Primary healthcare agent  [phone]



       (Mobile)[phone] (Home)

## 2020-03-25 NOTE — XMS REPORT
Summary of Care

 Created on:August 15, 2019



Patient:Mellissa Tobar

Sex:Female

:1990

External Reference #:[id number]





Demographics







 Address  [address] 



   [place] [address]

 

 Mobile Phone  [phone]

 

 Home Phone  [phone]

 

 Phone  [phone]

 

 Email Address  [email or web address]

 

 Email Address  [email or web address]

 

 Preferred Language  English

 

 Marital Status  Single

 

 Jewish Affiliation  Unknown

 

 Race  White

 

 Ethnic Group  Not  or 









Author







 Organization  [facility]

 

 Address  [address] [place] [address]









Support







 Name  Relationship  Address  Phone

 

 Gerhard Tobar  Unavailable  [address]   [phone]



     [place] [address]  

 

 Cruz Contreras  Unavailable  [address]  [phone]



     [place] [address]  









Care Team Providers







 Name  Role  Phone

 

 Doctor Unassigned, No Name  Medicaid Hmo  Unavailable

 

 Faculty, Ang Rmchdede Mfm  Unavailable  Unavailable

 

 Jayy PriceP  Unavailable  [phone]









Encounter Details







 Date  Type  Department  Care Team  Description

 

 08/15/2019  Orders Only  [facility]



  Doctor Unassigned, No  



     [address] [facility]



  Name



  



     [address] [address]  







Allergies

No Known Allergiesdocumented as of this encounter (statuses as of 08/15/2019)



Medications







 Medication  Sig  Dispensed  Refills  Start Date  End Date  Status

 

 hydroxyprogesterone  Weekly IM inj  1 mL  0  2017    Active



 caproate, ppres, 250  starting at 16          



 mg/mL injection  wk gestation          

 

 proMETHazine 25 mg  Take 1 tablet  12 tablet  0  2017    Active



 tabletIndications: High  by mouth every          



 risk pregnancy,  6 (six) hours          



 antepartum  as needed for          



   Nausea and          



   Vomiting (N/V).          

 

 proMETHazine 25 mg tablet  Take 1 tablet  60 tablet  1  2017    Active



   by mouth every          



   4 (four) hours          



   as needed          



   (nausea).          

 

 PNV73-iron  Take 2 tablets  60 Each  11  2017    Active



 carb,glu-FA-dss-dha  by mouth daily.          



 (CITRANATAL ASSURE) 35 mg  Please fill          



 iron-1 mg -50 mg-300 mg  with similar          



 combo packIndications:  vitamin covered          



 High risk pregnancy,  by patient's          



 antepartum  insurance.          



documented as of this encounter (statuses as of 08/15/2019)



Active Problems







 Problem  Noted Date

 

 Rh negative state in antepartum period  2017

 

 High risk pregnancy, antepartum  2017

 

 Current pregnancy with history of  labor, second trimester  2017

 

 Rh negative state in antepartum period, first trimester  2017

 

 H/O cervical incompetence  2017

 

 Recurrent pregnancy loss, antepartum condition or complication  2017

 

 History of anemia  2017

 

 History of PID  2017



documented as of this encounter (statuses as of 08/15/2019)



Resolved Problems







 Problem  Noted Date  Resolved Date

 

 Well woman exam with routine gynecological exam  2017

 

 Screening for STD (sexually transmitted disease)  2017

 

 Other general counseling and advice for contraceptive  2017



 management    

 

 Threatened  in early pregnancy  2015

 

 Unsure of last menstrual period as reason for ultrasound  2015



 scan    

 

 History of cervical incompetence in pregnancy, currently  2015



 pregnant, first trimester    

 

 Rh negative state in antepartum period, first trimester,  2015



 fetus 1    

 

 Pregnancy with other poor reproductive history  07/10/2014  2017

 

 High-risk pregnancy  07/10/2014  2017

 

 History of  delivery, currently pregnant  2014









 Overview: 







 Delivered at 21 weeks









 Rh negative status during pregnancy, antepartum  2014



documented as of this encounter (statuses as of 08/15/2019)



Immunizations







 Name  Administration Dates  Next Due

 

 Rho (d) Immune Globulin  08/10/2017  

 

 Td  2010  



documented as of this encounter



Social History







 Tobacco Use  Types  Packs/Day  Years Used  Date

 

 Former Smoker  Cigarettes  0.1  1  Quit: 2014









 Smokeless Tobacco: Never Used      









 Comments: smokes 5 cigarettes per week









 Alcohol Use  Drinks/Week  oz/Week  Comments

 

 No  0 Standard drinks or equivalent  0.0  









 Sex Assigned at Birth  Date Recorded

 

 Not on file  









 Job Start Date  Occupation  Industry

 

 Not on file  Not on file  Not on file









 Travel History  Travel Start  Travel End









 No recent travel history available.



documented as of this encounter



Last Filed Vital Signs

Not on filedocumented in this encounter



Plan of Treatment







 Date  Type  Specialty  Care Team  Description

 

 08/15/2019  Initial Prenatal Visit  [facility]  Ami Alcala, FNP



  



       [address]



  



       [address]



  



       [place] [address]



  



       [phone] (Fax)  









 Health Maintenance  Due Date  Last Done  Comments

 

 VARICELLA VACCINES (1 of 2  2002    



 - 13+ 2-dose series)      

 

 DTaP,Tdap,and Td Vaccines  2010  



 (1 - Tdap)      

 

 INFLUENZA VACCINE (#1)  2019    

 

 PAP SMEAR  2020, 2014,  



     2010, Additional  



     history exists  

 

 PNEUMOCOCCAL 0-64 YEARS  Aged Out    No longer eligible



 COMBINED SERIES      based on patient's age



       to complete this topic



documented as of this encounter



Procedures







 Procedure Name  Priority  Date/Time  Associated Diagnosis  Comments

 

 ASSIGNMENT OF BENEFITS  Routine  08/15/2019  8:50 AM CDT    



documented in this encounter



Results

Not on filedocumented in this encounter



Advance Directives







 Name  Relationship  Healthcare Agent Relationship  Communication

 

 Gerhard Amadou Harrell  Primary healthcare agent  [phone]



       (Mobile)[phone] (Home)

 

 Cruz Contreras  Other  First alternate healthcare  [phone]



     agent  (Mobile)[phone] (Home)

## 2020-03-25 NOTE — XMS REPORT
Summary of Care

 Created on:2019



Patient:Mellissa Tobar

Sex:Female

:1990

External Reference #:[id number]





Demographics







 Address  [address]



   [place] [address]

 

 Mobile Phone  [phone]

 

 Home Phone  [phone]

 

 Phone  [phone]

 

 Email Address  [email or web address]

 

 Email Address  [email or web address]

 

 Preferred Language  English

 

 Marital Status  

 

 Muslim Affiliation  Unknown

 

 Race  White

 

 Ethnic Group  Not  or 









Author







 Organization  [facility]

 

 Address  [address] [place] [address]









Support







 Name  Relationship  Address  Phone

 

 Gerhard Tobar  Unavailable  [address]   [phone]



     [place] [address]  

 

 Cruz Diane  Unavailable  [address]  [phone]



     [place] [address]  









Care Team Providers







 Name  Role  Phone

 

 Doctor Unassigned, No Name  Medicaid Hmo  Unavailable

 

 Faculty, Ashish Rmchp Mfm  Unavailable  Unavailable

 

 Jayy Price FNP  Unavailable  [phone]

 

 Ami Alcala FNP  Primary Care Provider  [phone]









Encounter Details







 Date  Type  Department  Care Team  Description

 

 2019  Orders Only  [facility]



  Doctor Unassigned, No  



     [address] [facility]



  Name



  



     [place] [address]  [address] [place] [address]  







Allergies

No Known Allergiesdocumented as of this encounter (statuses as of 2019)



Medications







 Medication  Sig  Dispensed  Refills  Start Date  End Date  Status

 

 proMETHazine 25 mg  Take 1 tablet by  30 tablet  3  2019    Active



 tabletIndications:  mouth every 4          



 Nausea/vomiting in  (four) hours as          



 pregnancy  needed for Nausea          



   and Vomiting          



   (N/V).          

 

 PNV 67-iron ps-folate  Take 1 capsule by  30 capsule  11  2019    Active



 no.1-dha (VITAFOL  mouth daily.          



 ULTRA) 29 mg iron- 1            



 mg-200 mg            



 CapIndications: High            



 risk pregnancy,            



 antepartum            



documented as of this encounter (statuses as of 2019)



Active Problems







 Problem  Noted Date

 

 History of fetal anomaly in prior pregnancy, currently pregnant  08/15/2019

 

 High risk pregnancy, antepartum  2017

 

 Current pregnancy with history of pre-term labor in first trimester  2017

 

 Rh negative state in antepartum period, first trimester  2017

 

 H/O cervical incompetence  2017

 

 Recurrent pregnancy loss, antepartum condition or complication  2017

 

 History of PID  2017

 

 Fetal demise, less than 22 weeks  2014









 Pregnant  Estimated Date of Delivery  Comments

 

 Yes  2020  Based on last menstrual period of 2019



     (Approximate)



documented as of this encounter (statuses as of 2019)



Resolved Problems







 Problem  Noted Date  Resolved Date

 

 Supervision of pregnancy with other poor reproductive or  2017  08/15/
2019



 obstetric history, third trimester    

 

 Preg care for patient w recurrent preg loss, third trimester  2017  08/15
/2019

 

 Suprvsn of preg w history of pre-term labor, third trimester  2017  08/15
/2019

 

 Rh negative state in antepartum period  2017  08/15/2019

 

 Well woman exam with routine gynecological exam  2017

 

 Screening for STD (sexually transmitted disease)  2017

 

 History of anemia  2017  08/15/2019

 

 Other general counseling and advice for contraceptive  2017



 management    

 

 Threatened  in early pregnancy  2015

 

 Unsure of last menstrual period as reason for ultrasound  2015



 scan    

 

 History of cervical incompetence in pregnancy, currently  2015



 pregnant, first trimester    

 

 Rh negative state in antepartum period, first trimester,  2015



 fetus 1    

 

 Pregnancy with other poor reproductive history  07/10/2014  2017

 

 High-risk pregnancy  07/10/2014  2017

 

 History of  delivery, currently pregnant  2014









 Overview: 







 Delivered at 21 weeks









 Rh negative status during pregnancy, antepartum  2014



documented as of this encounter (statuses as of 2019)



Immunizations







 Name  Administration Dates  Next Due

 

 Rho (d) Immune Globulin  08/10/2017  

 

 Td  2010  



documented as of this encounter



Social History







 Tobacco Use  Types  Packs/Day  Years Used  Date

 

 Former Smoker  Cigarettes  0.1  1  Quit: 2014









 Smokeless Tobacco: Never Used      









 Alcohol Use  Drinks/Week  oz/Week  Comments

 

 No  0 Standard drinks or equivalent  0.0  









 Pregnant  Estimated Date of Delivery  Comments

 

 Yes  2020  Based on last menstrual period of 2019



     (Approximate)









 Sex Assigned at Birth  Date Recorded

 

 Not on file  









 Job Start Date  Occupation  Industry

 

 Not on file  Not on file  Not on file









 Travel History  Travel Start  Travel End









 No recent travel history available.



documented as of this encounter



Last Filed Vital Signs

Not on filedocumented in this encounter



Plan of Treatment







 Date  Type  Specialty  Care Team  Description

 

 2019  Routine Prenatal Visit  [facility]  Ami Alcala, FNP



  



       [address]



  



       [address]



  



       [place], [place] [address] [phone] (Fax)  









 Health Maintenance  Due Date  Last Done  Comments

 

 INFLUENZA VACCINE (#1)  2019    

 

 DTaP,Tdap,and Td Vaccines  2019  Postponed from



 (1 - Tdap)      2010 (Pregnant or



       Breastfeeding)

 

 PAP SMEAR  08/15/2022  08/15/2019, 2017,  



     2014, Additional  



     history exists  

 

 PNEUMOCOCCAL 0-64 YEARS  Aged Out    No longer eligible



 COMBINED SERIES      based on patient's age



       to complete this topic



documented as of this encounter



Procedures







 Procedure Name  Priority  Date/Time  Associated Diagnosis  Comments

 

 CONSENT/REFUSAL FOR  Routine  2019  9:06 AM CDT    



 DIAGNOSIS AND TREATMENT        



documented in this encounter



Results

Not on filedocumented in this encounter



Insurance







 Payer  Benefit Plan /  Subscriber ID  Effective Dates  Phone  Address  Type



   Group          

 

 [employer]  xxxxxxxxx  2019-Present  [phone]  [address]  Medicaid



   [place]        [id number] [place] [address]  



           [address]  



documented as of this encounter



Advance Directives







 Name  Relationship  Healthcare Agent Relationship  Communication

 

 Gerhard Harrell  Primary healthcare agent  [phone]



       (Mobile)[phone] (Home)

## 2020-03-25 NOTE — XMS REPORT
Summary of Care

 Created on:2019



Patient:Mellissa Tobar

Sex:Female

:1990

External Reference #:[id number]





Demographics







 Address  [address]



   [place] [address]

 

 Mobile Phone  [phone]

 

 Home Phone  [phone]

 

 Phone  [phone]

 

 Email Address  [email or web address]

 

 Email Address  [email or web address]

 

 Preferred Language  English

 

 Marital Status  

 

 Zoroastrianism Affiliation  Unknown

 

 Race  White

 

 Ethnic Group  Not  or 









Author







 Organization  [facility]

 

 Address  [address] [place] [address]









Support







 Name  Relationship  Address  Phone

 

 Gerhard Tobar  Unavailable  [address]   [phone]



     [place] [address]  

 

 Cruz Contreras  Unavailable  [address]  [phone]



     [place] [address]  









Care Team Providers







 Name  Role  Phone

 

 Doctor Unassigned, No Name  Medicaid Hmo  Unavailable

 

 Ernestina, [facility]ish [facility]  Unavailable  Unavailable

 

 Jayy Price FNP  Unavailable  [phone]

 

 Ami Alcala FNP  Primary Care Provider  [phone]









Reason for Visit







 Reason  Comments

 

 Referral/consult  







Encounter Details







 Date  Type  Department  Care Team  Description

 

 2019  Telephone  [facility]  Ami Alcala, P



  Referral/consult



     [employer]



  [address]



  



     [address]



  [address]



  



     [place] [address]



  [place], [place] [address] [phone] (Fax)  







Allergies

No Known Allergiesdocumented as of this encounter (statuses as of 2019)



Medications







 Medication  Sig  Dispensed  Refills  Start Date  End Date  Status

 

 proMETHazine 25 mg  Take 1 tablet by  30 tablet  3  2019    Active



 tabletIndications:  mouth every 4          



 Nausea/vomiting in  (four) hours as          



 pregnancy  needed for Nausea          



   and Vomiting          



   (N/V).          

 

 PNV 67-iron ps-folate  Take 1 capsule by  30 capsule  11  2019    Active



 no.1-dha (VITAFOL  mouth daily.          



 ULTRA) 29 mg iron- 1            



 mg-200 mg            



 CapIndications: High            



 risk pregnancy,            



 antepartum            



documented as of this encounter (statuses as of 2019)



Active Problems







 Problem  Noted Date

 

 History of fetal anomaly in prior pregnancy, currently pregnant  08/15/2019

 

 High risk pregnancy, antepartum  2017

 

 Current pregnancy with history of pre-term labor in first trimester  2017

 

 Rh negative state in antepartum period, first trimester  2017

 

 H/O cervical incompetence  2017

 

 Recurrent pregnancy loss, antepartum condition or complication  2017

 

 History of PID  2017

 

 Fetal demise, less than 22 weeks  2014









 Pregnant  Estimated Date of Delivery  Comments

 

 Yes  2020  Based on last menstrual period of 2019



     (Approximate)



documented as of this encounter (statuses as of 2019)



Resolved Problems







 Problem  Noted Date  Resolved Date

 

 Supervision of pregnancy with other poor reproductive or  2017  08/15/
2019



 obstetric history, third trimester    

 

 Preg care for patient w recurrent preg loss, third trimester  2017  08/15
/2019

 

 Suprvsn of preg w history of pre-term labor, third trimester  2017  08/15
/2019

 

 Rh negative state in antepartum period  2017  08/15/2019

 

 Well woman exam with routine gynecological exam  2017

 

 Screening for STD (sexually transmitted disease)  2017

 

 History of anemia  2017  08/15/2019

 

 Other general counseling and advice for contraceptive  2017



 management    

 

 Threatened  in early pregnancy  2015

 

 Unsure of last menstrual period as reason for ultrasound  2015



 scan    

 

 History of cervical incompetence in pregnancy, currently  2015



 pregnant, first trimester    

 

 Rh negative state in antepartum period, first trimester,  2015



 fetus 1    

 

 Pregnancy with other poor reproductive history  07/10/2014  2017

 

 High-risk pregnancy  07/10/2014  2017

 

 History of  delivery, currently pregnant  2014









 Overview: 







 Delivered at 21 weeks









 Rh negative status during pregnancy, antepartum  2014



documented as of this encounter (statuses as of 2019)



Immunizations







 Name  Administration Dates  Next Due

 

 Rho (d) Immune Globulin  2019, 08/10/2017  

 

 Td  2010  



documented as of this encounter



Social History







 Tobacco Use  Types  Packs/Day  Years Used  Date

 

 Former Smoker  Cigarettes  0.1  1  Quit: 2014









 Smokeless Tobacco: Never Used      









 Alcohol Use  Drinks/Week  oz/Week  Comments

 

 No  0 Standard drinks or equivalent  0.0  









 Pregnant  Estimated Date of Delivery  Comments

 

 Yes  2020  Based on last menstrual period of 2019



     (Approximate)









 Sex Assigned at Birth  Date Recorded

 

 Not on file  









 Job Start Date  Occupation  Industry

 

 Not on file  Not on file  Not on file









 Travel History  Travel Start  Travel End









 No recent travel history available.



documented as of this encounter



Last Filed Vital Signs

Not on filedocumented in this encounter



Plan of Treatment







 Date  Type  Specialty  Care Team  Description

 

 2019  Routine Prenatal Visit  [facility]  Ami Alcala, FNP



  



       [address]



  



       [place] [address]



  



       [phone] (Fax)  









 Health Maintenance  Due Date  Last Done  Comments

 

 INFLUENZA VACCINE (#1)  2019    

 

 DTaP,Tdap,and Td Vaccines  2019  Postponed from



 (1 - Tdap)      2010 (Pregnant or



       Breastfeeding)

 

 PAP SMEAR  08/15/2022  08/15/2019, 2017,  



     2014, Additional  



     history exists  

 

 PNEUMOCOCCAL 0-64 YEARS  Aged Out    No longer eligible



 COMBINED SERIES      based on patient's age



       to complete this topic



documented as of this encounter



Results

Not on filedocumented in this encounter



Insurance







 Payer  Benefit Plan /  Subscriber ID  Effective Dates  Phone  Address  Type



   Group          

 

 [place] [employer]  [place] [employer]  [place]  2019-Present      Medicaid



 HEALTH PLAN -  HEALTH          



 MANAGED MEDICAID            



documented as of this encounter



Advance Directives







 Name  Relationship  Healthcare Agent Relationship  Communication

 

 Gerhard Amadou Harrell  Primary healthcare agent  [phone]



       (Mobile)[phone] (Home)

## 2020-03-25 NOTE — XMS REPORT
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 [address]  [phone]
[phone]



   [facility]    



PRENATAL WORKUP, BLOOD BANK (08/15/2019 10:43 AM CDT)





 Component  Value  Ref Range  Performed At  Pathologist Signature

 

 ABO & RH  O NEGATIVE    LAB  



   Comment:      



   Performed at [facility] Laboratory Services - [facility] Blood [place] [address]      



   Toll Free: [phone]      



   CLIA No. [id number]      



         

 

 IAT  Negative    LAB  



   Comment:      



   Performed at [facility] - [facility] Blood [place] [address]      



   Toll Free: [phone]      



   CLIA No. [id number]      



         









 Specimen

 

 Blood - VENOUS









 Performing Organization  Address  City/State/Zipcode  Phone Number

 

 B[facility]      

 

 LAB      



HIV 1/2 AG-AB WITH REFLEX (08/15/2019 10:43 AM CDT)





 Component  Value  Ref Range  Performed At  Pathologist [facility]

 

 HIV 1/2 Ag-Ab with  Negative  Negative  [facility] LABORATORY  



 Reflex      SERVICES  

 

 HIV Semi-quantitative  0.08    [facility] LABORATORY  



       SERVICES  









 Specimen

 

 Blood - ARM, LEFT









 Narrative  Performed At

 

 Non-reactive for HIV-1 antigen and HIV-1/HIV-2  [facility] LABORATORY SERVICES



 antibodies.No laboratory evidence of HIV  



 infection.Repeat in 2-4 weeks if acute HIV infection is  



 suspected.  









 Performing Organization  Address  City/[facility]  Phone Number

 

 [facility] LABORATORY SERVICES  CLIA:  [id number], [address] [place] [address]  [phone]
[phone]



   [facility]    



HEPATITIS B SURFACE ANTIGEN (08/15/2019 10:43 AM CDT)





 Component  Value  Ref Range  Performed At  Pathologist



         Signature

 

 HBsAg  HEPATITIS B  Negative  [facility] LABORATORY  



   SURFACE ANTIGEN    SERVICES  



   NEGATIVE      

 

 HBsAg  0.05    [facility] LABORATORY  



 Semi-Quantitative      SERVICES  









 Specimen

 

 Blood - ARM, LEFT









 Performing Organization  Address  City/State/[facility]  Phone Number

 

 [facility] LABORATORY SERVICES  CLIA:  [id number], [address] [place] [address]  [phone]
[phone]



   [facility]    



GLUCOSE 1 HOUR POST PRANDIAL (08/15/2019 10:43 AM CDT)





 Component  Value  Ref Range  Performed At  Pathologist [facility]

 

 GLUC 1 HR  79 (L)  120 - 170 mg/dL  [facility] LABORATORY SERVICES  









 Specimen

 

 Blood - ARM, LEFT









 Performing Organization  Address  City/State/[facility]  Phone Number

 

 [facility] LABORATORY SERVICES  CLIA:  [id number], [address] [place] [address]  [phone]
[phone]



   [facility]    



PAP Smear-Liquid Based (08/15/2019 10:12 AM CDT)





 Specimen

 

 Swab - CERVIX









 Performing Organization  Address  City/State/[facility]  Phone Number

 

 [facility] LABORATORY SERVICES  CLIA:  [id number], [address] [place] [address]  [phone]
[phone]



   [facility]    



POCT URINALYSIS W/O SPECIFIC GRAVITY (08/15/2019  9:32 AM CDT)





 Component  Value  Ref Range  Performed At  [facility]

 

 POCT PH U  5  5 - 8 mg/dl    

 

 POCT U LEUK EST  neg  Negative - Negative    

 

 POCT U NIT  neg  Negative - Negative    

 

 POCT U PROT  trace  Negative - Negative    

 

 POCT U GLU  neg  Negative - Negative    

 

 POCT U KETONE  neg  Negative - Negative    

 

 POCT U BLD  neg  Negative - Negative    









 Specimen

 

 Urine - URINE, CLEAN CATCH



POCT PREGNANCY TEST (08/15/2019  9:32 AM CDT)





 Component  Value  Ref Range  Performed At  Pathologist Signature

 

 POCT PREG  Positive      

 

 On board controls acceptable  Yes      



 with C Line        

 

 POCT PREG LOT #        

 

 POCT PREG TEST  DATE        









 Specimen

 

 Urine - URINE, CLEAN CATCH



documented in this encounter



Visit Diagnoses







 Diagnosis

 

 High-risk pregnancy in first trimester - Primary

 

 H/O cervical incompetence







 Personal history of other genital system and obstetric disorders

 

 Recurrent pregnancy loss, antepartum condition or complication

 

 Rh negative state in antepartum period, first trimester

 

 Current pregnancy with history of pre-term labor in first trimester

 

 History of fetal anomaly in prior pregnancy, currently pregnant







 Pregnancy with other poor obstetric history



documented in this encounter



Insurance







 Payer  Benefit Plan /  Subscriber ID  Effective  Phone  Address  Type



   Group    Dates      

 

 MEDICAID MEDICAID  PENDING  8/15/2019-[address]  Pending



 PENDING  PENDING    [place]  



           [address]  









 Guarantor Name  Account Type  Relation to  Date of  Phone  Billing



     Patient  Birth    Address

 

 TobarVasquezMellissa  Personal/Family  Self  1990  [phone]  [address] [place] [address] [address]        (Home)  [place] [address]



documented as of this encounter



Advance Directives







 Name  Relationship  Healthcare Agent Relationship  Communication

 

 Gerhard Tobar  Father  Primary healthcare agent  [phone]



       (Mobile)[phone] (Home)

 

 Cruz Contreras  Other  First alternate healthcare  [phone]



     agent  (Mobile)[phone] (Home)

## 2020-03-25 NOTE — XMS REPORT
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  [phone]
[phone]



   [facility]    



PRENATAL WORKUP, BLOOD BANK (08/15/2019 10:43 AM CDT)





 Component  Value  Ref Range  Performed At  Pathologist Signature

 

 ABO & RH  O NEGATIVE    LAB  



   Comment:      



   Performed at [facility] Laboratory Services - [facility] Blood [place] [address]      



   Toll Free: [phone]      



   CLIA No. [id number]      



         

 

 IAT  Negative    LAB  



   Comment:      



   Performed at [facility] Laboratory Services - [facility] Blood [place] [address]      



   Toll Free: [phone]      



   CLIA No. [id number]      



         









 Specimen

 

 Blood - VENOUS









 Performing Organization  Address  City/State/Zipcode  Phone Number

 

 BLD      

 

 LAB      



HIV 1/2 AG-AB WITH REFLEX (08/15/2019 10:43 AM CDT)





 Component  Value  Ref Range  Performed At  Pathologist [facility]

 

 HIV 1/2 Ag-Ab with  Negative  Negative  [facility] LABORATORY  



 Reflex      SERVICES  

 

 HIV Semi-quantitative  0.08    [facility] LABORATORY  



       SERVICES  









 Specimen

 

 Blood - ARM, LEFT









 Narrative  Performed At

 

 Non-reactive for HIV-1 antigen and HIV-1/HIV-2  [facility] LABORATORY SERVICES



 antibodies.No laboratory evidence of HIV  



 infection.Repeat in 2-4 weeks if acute HIV infection is  



 suspected.  









 Performing Organization  Address  City/[facility]  Phone Number

 

 [facility] LABORATORY SERVICES  CLIA:  [id number], [address] [place] [address]  [phone]
[phone]



   [facility]    



HEPATITIS B SURFACE ANTIGEN (08/15/2019 10:43 AM CDT)





 Component  Value  Ref Range  Performed At  Pathologist



         Signature

 

 HBsAg  HEPATITIS B  Negative  [facility] LABORATORY  



   SURFACE ANTIGEN    SERVICES  



   NEGATIVE      

 

 HBsAg  0.05    [facility] LABORATORY  



 Semi-Quantitative      SERVICES  









 Specimen

 

 Blood - ARM, LEFT









 Performing Organization  Address  City/State/[facility]  Phone Number

 

 [facility] LABORATORY SERVICES  CLIA:  [id number], [address] [place] [address]  [phone]
[phone]



   [facility]    



GLUCOSE 1 HOUR POST PRANDIAL (08/15/2019 10:43 AM CDT)





 Component  Value  Ref Range  Performed At  Pathologist [facility]

 

 GLUC 1 HR  79 (L)  120 - 170 mg/dL  [facility] LABORATORY SERVICES  









 Specimen

 

 Blood - ARM, LEFT









 Performing Organization  Address  City/State/[facility]  Phone Number

 

 [facility] LABORATORY SERVICES  CLIA:  [id number], [address] [place] [address]  [phone]
[phone]



   [facility]    



PAP Smear-Liquid Based (08/15/2019 10:12 AM CDT)





 Specimen

 

 Swab - CERVIX









 Performing Organization  Address  City/State/[facility]  Phone Number

 

 [facility] LABORATORY SERVICES  CLIA:  [id number], [address] [place] [address]  [phone]
[phone]



   [facility]    



POCT URINALYSIS W/O SPECIFIC GRAVITY (08/15/2019  9:32 AM CDT)





 Component  Value  Ref Range  Performed At  [facility]

 

 POCT PH U  5  5 - 8 mg/dl    

 

 POCT U LEUK EST  neg  Negative - Negative    

 

 POCT U NIT  neg  Negative - Negative    

 

 POCT U PROT  trace  Negative - Negative    

 

 POCT U GLU  neg  Negative - Negative    

 

 POCT U KETONE  neg  Negative - Negative    

 

 POCT U BLD  neg  Negative - Negative    









 Specimen

 

 Urine - URINE, CLEAN CATCH



POCT PREGNANCY TEST (08/15/2019  9:32 AM CDT)





 Component  Value  Ref Range  Performed At  Pathologist Signature

 

 POCT PREG  Positive      

 

 On board controls acceptable  Yes      



 with C Line        

 

 POCT PREG LOT #        

 

 POCT PREG TEST  DATE        









 Specimen

 

 Urine - URINE, CLEAN CATCH



documented in this encounter



Visit Diagnoses







 Diagnosis

 

 High-risk pregnancy in first trimester - Primary

 

 H/O cervical incompetence







 Personal history of other genital system and obstetric disorders

 

 Recurrent pregnancy loss, antepartum condition or complication

 

 Rh negative state in antepartum period, first trimester

 

 Current pregnancy with history of pre-term labor in first trimester

 

 History of fetal anomaly in prior pregnancy, currently pregnant







 Pregnancy with other poor obstetric history



documented in this encounter



Insurance







 Payer  Benefit Plan /  Subscriber ID  Effective  Phone  Address  Type



   Group    Dates      

 

 MEDICAID MEDICAID  PENDING  8/15/2019-[address]  Pending



 PENDING  PENDING    [place]  



           [address]  









 Guarantor Name  Account Type  Relation to  Date of  Phone  Billing



     Patient  Birth    Address

 

 TobarVasquezMellissa  Personal/Family  Self  1990  [phone]  [address] [place] [address] [address]        (Home)  [place] [address]



documented as of this encounter



Advance Directives







 Name  Relationship  Healthcare Agent Relationship  Communication

 

 Gerhard Tobar  Father  Primary healthcare agent  [phone]



       (Mobile)[phone] (Home)

 

 Cruz Contreras  Other  First alternate healthcare  [phone]



     agent  (Mobile)[phone] (Home)

## 2020-03-25 NOTE — XMS REPORT
Summary of Care

 Created on:2020



Patient:Mellissa Tobar

Sex:Female

:1990

External Reference #:[id number]





Demographics







 Address  [address] [place] [address]

 

 Mobile Phone  [phone]

 

 Home Phone  [phone]

 

 Phone  [phone]

 

 Email Address  [email or web address]

 

 Email Address  [email or web address]

 

 Preferred Language  English

 

 Marital Status  

 

 Islam Affiliation  Unknown

 

 Race  White

 

 Ethnic Group  Not  or 









Author







 Organization  [facility]

 

 Address  [address] [place] [address]









Support







 Name  Relationship  Address  Phone

 

 Gerhard Tobar  Unavailable  [address]   [phone]



     [place] [address]  

 

 Cruz Contreras  Unavailable  [address]  [phone]



     [place] [address]  









Care Team Providers







 Name  Role  Phone

 

 Doctor Unassigned, No Name  Medicaid Hmo  Unavailable

 

 Ernestina, Ashish Linderdede [facility]  Unavailable  Unavailable

 

 Jayy Price FNP  Unavailable  [phone]

 

 Ami Alcala FN  Primary Care Provider  [phone]









Reason for Visit







 Reason  Comments

 

 NURSE VISIT  [place]







Encounter Details







 Date  Type  Department  Care Team  Description

 

 2020  Nurse Visit  [facility]  [facility] a[facility], CNP



[address] [place] [address] [phone] (Fax)  History of 



     [place]



  



Visit, [facility] Nurse  labor (Primary Dx)



     [address] [place], [place]    



     [address] [phone]    







Allergies

No Known Allergiesdocumented as of this encounter (statuses as of 2020)



Medications







 Medication  Sig  Dispensed  Refills  Start Date  End Date  Status

 

 PNV 67-iron ps-folate  Take 1 capsule by  30 capsule  11  2019    Active



 no.1-dha (VITAFOL  mouth daily.          



 ULTRA) 29 mg iron- 1            



 mg-200 mg            



 CapIndications: High            



 risk pregnancy,            



 antepartum            

 

 proMETHazine 25 mg  Take 1 tablet by  30 tablet  3  2019    Active



 tabletIndications:  mouth every 4          



 Nausea/vomiting in  (four) hours as          



 pregnancy  needed for Nausea          



   and Vomiting          



   (N/V).          

 

 HYDROXYprogest,PF,,pre      0  2019    Active



 g presv, 250 mg/mL (1            



 mL) injection            









 Hospital, Clinic, or Other  Ordered Dose  Route  Frequency  Start Date  End 
Date  Status



 Facility Administered            



 Medication            

 

 HYDROXYprogest(PF)(preg  250 mg  IM  QWEEKLY  2019  Active



 presv) (JAYNA) 250 mg/mL            



 (1 mL) injection 250 mg            



documented as of this encounter (statuses as of 2020)



Active Problems







 Problem  Noted Date

 

 Rh negative, antepartum  10/23/2019

 

 History of fetal anomaly in prior pregnancy, currently pregnant  08/15/2019

 

 High risk pregnancy, antepartum  2017

 

 Current pregnancy with history of pre-term labor in third trimester  2017

 

 H/O cervical incompetence  2017

 

 Recurrent pregnancy loss, antepartum condition or complication  2017

 

 History of PID  2017

 

 Fetal demise, less than 22 weeks  2014









 Pregnant  Estimated Date of Delivery  Comments

 

 Yes  2020  Based on last menstrual period of 2019



     (Approximate)



documented as of this encounter (statuses as of 2020)



Resolved Problems







 Problem  Noted Date  Resolved Date

 

 H/O  delivery, currently pregnant, second trimester  10/10/2019  12/30/
2019

 

 Supervision of pregnancy with other poor reproductive or  2017  08/15/
2019



 obstetric history, third trimester    

 

 Preg care for patient w recurrent preg loss, third trimester  2017  08/15
/2019

 

 Suprvsn of preg w history of pre-term labor, third trimester  2017  08/15
/2019

 

 Rh negative state in antepartum period  2017  08/15/2019

 

 Rh negative state in antepartum period, first trimester  2017

 

 Well woman exam with routine gynecological exam  2017

 

 Screening for STD (sexually transmitted disease)  2017

 

 History of anemia  2017  08/15/2019

 

 Other general counseling and advice for contraceptive  2017



 management    

 

 Threatened  in early pregnancy  2015

 

 Unsure of last menstrual period as reason for ultrasound  2015



 scan    

 

 History of cervical incompetence in pregnancy, currently  2015



 pregnant, first trimester    

 

 Rh negative state in antepartum period, first trimester,  2015



 fetus 1    

 

 Pregnancy with other poor reproductive history  07/10/2014  2017

 

 High-risk pregnancy  07/10/2014  2017

 

 History of  delivery, currently pregnant  2014









 Overview: 







 Delivered at 21 weeks









 Rh negative status during pregnancy, antepartum  2014



documented as of this encounter (statuses as of 2020)



Immunizations







 Name  Administration Dates  Next Due

 

 Rho (d) Immune Globulin  2020, 2019, 08/10/2017  

 

 TDAP (ADACEL) VACCINE  2020  

 

 Td  2010  



documented as of this encounter



Social History







 Tobacco Use  Types  Packs/Day  Years Used  Date

 

 Former Smoker  Cigarettes  0.1  1  Quit: 2014









 Smokeless Tobacco: Never Used      









 Alcohol Use  Drinks/Week  oz/Week  Comments

 

 No  0 Standard drinks or equivalent  0.0  









 Pregnant  Estimated Date of Delivery  Comments

 

 Yes  2020  Based on last menstrual period of 2019



     (Approximate)









 Sex Assigned at Birth  Date Recorded

 

 Not on file  









 Job Start Date  Occupation  Industry

 

 Not on file  Not on file  Not on file









 Travel History  Travel Start  Travel End









 No recent travel history available.



documented as of this encounter



Last Filed Vital Signs







 Vital Sign  Reading  Time Taken  Comments

 

 Blood Pressure  106/67  2020 11:11 AM CST  

 

 Pulse  102  2020 11:11 AM CST  

 

 Temperature  36.9 C (98.5 F)  2020 11:11 AM CST  

 

 Respiratory Rate  16  2020 11:11 AM CST  

 

 Oxygen Saturation  -  -  

 

 Inhaled Oxygen Concentration  -  -  

 

 Weight  75.4 kg (166 lb 2 oz)  2020 11:11 AM CST  

 

 Height  -  -  

 

 Body Mass Index  27.64  2020  9:08 AM CST  



documented in this encounter



Progress Notes

Alida Mendoza RN - 2020 10:30 AM CST30 year old female in clinic today 
for Jayna progesterone injection. 250 mg administered IM to leftgluteus- see 
MAR entry. Witnessed by ADDIE Golden LVN.  Medication supplied by outside 
pharmacy.  Pt tolerated injection well, warning signs discussed with her at 
this time. Pt instructed to RTC 1 wk for next dose or PRN. Pt verbalized 
understanding.

Alida Mendoza RN  2020  11:20 AM



Electronically signed by Alida Mendoza RN at 2020 11:20 AM 
CSTdocumented in this encounter



Plan of Treatment







 Date  Type  Specialty  Care Team  Description

 

 2020  Routine Prenatal Visit  [facility]  Ami Alcala, JUNIORP



  



       [address]



  



       [place] [address]



  



       [phone] (Fax)  

 

 2020  Nurse Visit  [facility]  Visit, [facility] Nurse  









 Health Maintenance  Due Date  Last Done  Comments

 

 INFLUENZA VACCINE (#1)  2020    Postponed from



       2019 (Refused)

 

 PAP SMEAR  08/15/2022  08/15/2019, 2017,  



     2014, Additional  



     history exists  

 

 DTaP,Tdap,and Td Vaccines  2030, 2010  



 (2 - Td)      

 

 PNEUMOCOCCAL 0-64 YEARS  Aged Out    No longer eligible



 COMBINED SERIES      based on patient's age



       to complete this topic



documented as of this encounter



Results

Not on filedocumented in this encounter



Visit Diagnoses







 Diagnosis

 

 History of  labor - Primary







 Personal history of pre-term labor



documented in this encounter



Administered Medications







 Medication Order  MAR Action  Action Date  Dose  Rate  Site

 

 HYDROXYprogest(PF)(preg  Given  2020 11:19 AM  250 mg    Left



 presv) (JAYNA) 250    CST      Dorsogluteal-IM



 mg/mL (1 mL) injection          



 250 mg



          



 250 mg, Intramuscular,          



 QWEEKLY, 14 doses, First          



 dose on 19 at          



 1200, Last dose on Mon          



 3/2/20 at 1200, Routine          









 Given  2020  9:27 AM CST  250 mg    Right Upper Quad. Gluteus

 

 Given  2020 10:33 AM CST  250 mg    Left Upper Quad. Gluteus









   



documented in this encounter



Insurance







 Payer  Benefit Plan /  Subscriber ID  Effective  Phone  Address  Type



   Group    [facility]  xxxxxxxxx  10/1/2019-Pres    [address]  Medicaid



 HEALTH CHOICE -  HEALTH CHOICE    [facility]    [id number]



  



 MANAGED  MEDICAID        [place]          [id number]  









 Guarantor Name  Account Type  Relation to  Date of  Phone  Billing



     Patient  Birth    Address

 

 Mellissa Tobar  Personal/Family  Self  1990  [phone]  [address] [place] [address] [address]        (Home)  [place] [address]



documented as of this encounter



Advance Directives







 Name  Relationship  Healthcare Agent Relationship  Communication

 

 Gerhard Tobar  Father  Primary healthcare agent  [phone]



       (Mobile)[phone] (Home)

## 2020-03-25 NOTE — XMS REPORT
Summary of Care

 Created on:2020



Patient:Mellissa Tobar

Sex:Female

:1990

External Reference #:[id number]





Demographics







 Address  [address]



   [place] [address]

 

 Mobile Phone  [phone]

 

 Home Phone  [phone]

 

 Phone  [phone]

 

 Email Address  [email or web address]

 

 Email Address  [email or web address]

 

 Preferred Language  English

 

 Marital Status  

 

 Latter day Affiliation  Unknown

 

 Race  White

 

 Ethnic Group  Not  or 









Author







 Organization  [facility]

 

 Address  [address] [place] [address]









Support







 Name  Relationship  Address  Phone

 

 Gerhard Tobar  Unavailable  [address]  [phone]



     [place] [address]  

 

 Cruz Contreras  Unavailable  [address]  [phone]



     [place] [address]  









Care Team Providers







 Name  Role  Phone

 

 Doctor Unassigned, No Name  Medicaid Hmo  Unavailable

 

 Faculty, Ashish Linderdede [facility]  Unavailable  Unavailable

 

 Jayy Price [facility]  Unavailable  [phone]

 

 Ami Alcala [facility]  Primary Care Provider  [phone]









Reason for Referral

 (Routine)





 Status  Reason  Specialty  Diagnoses /  Referred By  Referred To



       Procedures  Contact  Contact

 

 Closed    Maternal Fetal  Diagnoses



Fundal height low for dates in third trimester  Ami Alcala  



     Medicine  



Procedures



CONSULT MATERNAL FETAL MEDICINE ULTRASOUND Preferred Location: [facility]



  



         [address]



  



         [place]  



         [address]



  



         Phone:  



         [phone]



  



         Fax: [phone]  









Reason for Visit







 Reason  Comments

 

 Prenatal Care  







Encounter Details







 Date  Type  Department  Care Team  Description

 

 2020  Routine Prenatal  [facility]-  Ami Alcala  High-risk 
pregnancy in third trimester (Primary Dx);



   Visit  [facility]



  H/O cervical incompetence;



     [address]



  [address]



  Current pregnancy with history of pre-term labor in second trimester;



     [place]  [address]



  Rh negative, antepartum;



     [id number]



  [place]  Fundal height low for dates in third trimester



     [phone] [id number]



  



       [phone]  



       (Fax)  







Allergies

No Known Allergiesdocumented as of this encounter (statuses as of 2020)



Medications







 Medication  Sig  Dispensed  Refills  Start Date  End Date  Status

 

 PNV 67-iron ps-folate  Take 1 capsule by  30 capsule  11  2019    Active



 no.1-dha (VITAFOL  mouth daily.          



 ULTRA) 29 mg iron- 1            



 mg-200 mg            



 CapIndications: High            



 risk pregnancy,            



 antepartum            

 

 proMETHazine 25 mg  Take 1 tablet by  30 tablet  3  2019    Active



 tabletIndications:  mouth every 4          



 Nausea/vomiting in  (four) hours as          



 pregnancy  needed for Nausea          



   and Vomiting          



   (N/V).          

 

 HYDROXYprogest,PF,,pre      0  2019    Active



 g presv, 250 mg/mL (1            



 mL) injection            









 Hospital, Clinic, or Other  Ordered Dose  Route  Frequency  Start Date  End 
Date  Status



 Facility Administered            



 Medication            

 

 HYDROXYprogest(PF)(preg  250 mg  IM  QWEEKLY  2019  Active



 presv) (JAYNA) 250 mg/mL            



 (1 mL) injection 250 mg            

 

 rho(D) immune globulin  300 mcg  IM  ONCE  2020  Ended



 (RHOGAM) syringe 300            



 mcgIndications: Rh            



 negative, antepartum            



documented as of this encounter (statuses as of 2020)



Active Problems







 Problem  Noted Date

 

 Rh negative, antepartum  10/23/2019

 

 History of fetal anomaly in prior pregnancy, currently pregnant  08/15/2019

 

 High risk pregnancy, antepartum  2017

 

 Current pregnancy with history of pre-term labor in second trimester  2017

 

 H/O cervical incompetence  2017

 

 Recurrent pregnancy loss, antepartum condition or complication  2017

 

 History of PID  2017

 

 Fetal demise, less than 22 weeks  2014









 Pregnant  Estimated Date of Delivery  Comments

 

 Yes  2020  Based on last menstrual period of 2019



     (Approximate)



documented as of this encounter (statuses as of 2020)



Resolved Problems







 Problem  Noted Date  Resolved Date

 

 H/O  delivery, currently pregnant, second trimester  10/10/2019  12/30/
2019

 

 Supervision of pregnancy with other poor reproductive or  2017  08/15/
2019



 obstetric history, third trimester    

 

 Preg care for patient w recurrent preg loss, third trimester  2017  08/15
/2019

 

 Suprvsn of preg w history of pre-term labor, third trimester  2017  08/15
/2019

 

 Rh negative state in antepartum period  2017  08/15/2019

 

 Rh negative state in antepartum period, first trimester  2017

 

 Well woman exam with routine gynecological exam  2017

 

 Screening for STD (sexually transmitted disease)  2017

 

 History of anemia  2017  08/15/2019

 

 Other general counseling and advice for contraceptive  2017



 management    

 

 Threatened  in early pregnancy  2015

 

 Unsure of last menstrual period as reason for ultrasound  2015



 scan    

 

 History of cervical incompetence in pregnancy, currently  2015



 pregnant, first trimester    

 

 Rh negative state in antepartum period, first trimester,  2015



 fetus 1    

 

 Pregnancy with other poor reproductive history  07/10/2014  2017

 

 High-risk pregnancy  07/10/2014  2017

 

 History of  delivery, currently pregnant  2014









 Overview: 







 Delivered at 21 weeks









 Rh negative status during pregnancy, antepartum  2014



documented as of this encounter (statuses as of 2020)



Immunizations







 Name  Administration Dates  Next Due

 

 Rho (d) Immune Globulin  2020, 2019, 08/10/2017  

 

 TDAP (ADACEL) VACCINE  2020  

 

 Td  2010  



documented as of this encounter



Social History







 Tobacco Use  Types  Packs/Day  Years Used  Date

 

 Former Smoker  Cigarettes  0.1  1  Quit: 2014









 Smokeless Tobacco: Never Used      









 Alcohol Use  Drinks/Week  oz/Week  Comments

 

 No  0 Standard drinks or equivalent  0.0  









 Pregnant  Estimated Date of Delivery  Comments

 

 Yes  2020  Based on last menstrual period of 2019



     (Approximate)









 Sex Assigned at Birth  Date Recorded

 

 Not on file  









 Job Start Date  Occupation  Industry

 

 Not on file  Not on file  Not on file









 Travel History  Travel Start  Travel End









 No recent travel history available.



documented as of this encounter



Last Filed Vital Signs







 Vital Sign  Reading  Time Taken  Comments

 

 Blood Pressure  110/71  2020  2:46 PM CST  

 

 Pulse  105  2020  2:46 PM CST  

 

 Temperature  36.4 C (97.5 F)  2020  2:46 PM CST  

 

 Respiratory Rate  16  2020  2:46 PM CST  

 

 Oxygen Saturation  -  -  

 

 Inhaled Oxygen Concentration  -  -  

 

 Weight  73.1 kg (161 lb 2 oz)  2020  2:46 PM CST  

 

 Height  165.1 cm (5' 5")  2020  2:46 PM CST  

 

 Body Mass Index  26.81  2020  2:46 PM CST  



documented in this encounter



Progress Notes

Lissy Oleary LVN - 2020  2:30 PM CST30 year old female in clinic 
today for Crosby progesterone injection. 250 mg administered IM to right gluteus
- see MAR entry. Witnessed by YUE Segal RN.  Medication supplied by 
outside pharmacy.  Pt tolerated injection well, warning signs discussed with 
her at this time. Pt instructed to RTC 1 wkfor next dose or PRN. Pt verbalized 
understanding.

Lissy Oleary LVN  2020  2:32 PM



Electronically signed by Lissy Oleary LVN at 2020  2:32 PM CSTRuLissy queen LVN - 2020  2:30 PM CSTPatient provided with 28 weeks pregnancy 
packet; stressed the importance of the kick count of 10 x within 2 hours; 
patient verbalized understanding.

Tdap given IM to right deltoid per aseptic tech; site massaged; band-aid applied
; tolerated well; VIS given and reviewed with patient at this time.

Shared decision plan completed today.

Reviewed s/s of  labor.

PHQ2 done at this time.

Patient denies any complications at this time.

Electronically signed by Lissy Oleary LVN at 2020  3:38 PM Ami Hall FNP - 2020  2:30 PM CSTFormatting of this note might be 
different from the original.

Chief complaint:

Chief Complaint

Patient presents with

 Prenatal Care



HPI



Mellissa Tobar is a 30 year old female is a 

@ 28w0d here for prenatal visit. Patient's last menstrual period was 2019 
(approximate). Estimated Date of Delivery: 20



Today she denies any complaints or concerns.



She is taking PNV. She reports good FM. She denies any ctx/cramping, VB, LOF, HA
, visual disturbance, vaginal discharge or dysuria. She denies any foreign 
travel. She also denies any physical, sexual or emotional abuse.



Histories

OB History

 Para Term  AB Living

6 3 0 2 2 1

SAB TAB Ectopic Multiple Live Births

1 1 0 0 2



# Outcome Date GA Lbr Damon/2nd Weight Sex Delivery Anes PTL Lv

6 Current

5  17 31w6d  3 lb 11 oz (1.673 kg) F NORMAL SPONT   DEANGEOL

4 SAB 07/01/15 11w0d

3 Para 14 17w0d    Vag-Spont   FD

2 TAB 08 11w0d

1  05 21w0d  1 lb 2 oz (0.51 kg) M NORMAL SPONT  Y ND

   Birth Comments: Incompentent cervix



Obstetric Comments

 (different partner)



,,, current pregnacy (current partner)



Past Medical History:

Diagnosis Date

 Anemia 

 resolved, related to pregnancy

 Anxiety

 self reported-no tx

 History of anemia 2017

 Menstrual disorder 2013

 irregular, heavy, and painful

 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 
2014

 Screening for STD (sexually transmitted disease) 2017



Family History

Problem Relation Age of Onset

 Other - see comments Mother

     cervical CA

 Ovarian Cancer Maternal Grandmother

 Breast Cancer Maternal Grandmother

 Arthritis Father

 Hypertension Father

 No Significant Medical Problems Sister

 Breast Cancer Paternal Grandmother

 Asthma NoFHx

 Birth defects NoFHx

 Colon Cancer NoFHx

 Cancer NoFHx

 Depression NoFHx

 Diabetes NoFHx

 Genetic NoFHx

 Heart NoFHx

 High cholesterol NoFHx

 Mental retardation NoFHx

 Neurological NoFHx

 Osteoporosis NoFHx

 Psychiatry NoFHx



Family Status

Relation Name Status

 Mo  Alive

 MGMo  Alive

 Fa  Alive

 Sis  Alive

 MAunt  Alive

 MUnc  Alive

 PAunt  Alive

 PUnc  Alive

 MGFa  Alive

 PGMo  Alive

 PGFa  Alive

 NoFHx  (Not Specified)



Past Surgical History:

Procedure Laterality Date

 DILATION AND CURETTAGE (SHX)  2008



Social History



Socioeconomic History

 Marital status: 

  Spouse name: Not on file

 Number of children: 0

 Years of education: 15

 Highest education level: Not on file

Occupational History

 Occupation: Unemployed

Social Needs

 Financial resource strain: Not on file

 Food insecurity:

  Worry: Not on file

  Inability: Not on file

 Transportation needs:

  Medical: Not on file

  Non-medical: Not on file

Tobacco Use

 Smoking status: Former Smoker

  Packs/day: 0.10

  Years: 1.00

  Pack years: 0.10

  Types: Cigarettes

  Last attempt to quit: 2014

  Years since quittin.6

 Smokeless tobacco: Never Used

Substance and Sexual Activity

 Alcohol use: No

  Alcohol/week: 0.0 standard drinks

 Drug use: No

 Sexual activity: Yes

  Partners: Male

  Birth control/protection: None

  Comment: last sexual intercourse 2019

Lifestyle

 Physical activity:

  Days per week: Not on file

  Minutes per session: Not on file

 Stress: Not on file

Relationships

 Social connections:

  Talks on phone: Not on file

  Gets together: Not on file

  Attends Rastafarian service: Not on file

  Active member of club or organization: Not on file

  Attends meetings of clubs or organizations: Not on file

  Relationship status: Not on file

 Intimate partner violence:

  Fear of current or ex partner: Not on file

  Emotionally abused: Not on file

  Physically abused: Not on file

  Forced sexual activity: Not on file

Other Topics Concern

 Not on file

Social History Narrative

 Denies domestic violence or abuse

 No exposure to cats

 No Rastafarian preference

 Patient lives with father and child.



Social History



Substance and Sexual Activity

Sexual Activity Yes

 Partners: Male

 Birth control/protection: None

 Comment: last sexual intercourse 2019







Labs

I have reviewed the patient's labs. and Labs are pending.



Radiology

Radiology pending.



Allergies

Mellissa has No Known Allergies.



Medications

Mellissa has a current medication list which includes the following 
prescription(s): hydroxyprogest(pf)(preg presv), promethazine, and pnv 67-iron 
ps-folate no.1-dha, and the following Facility-Administered Medications: rho(d) 
immune globulin and hydroxyprogest(pf)(preg presv).



Review of Systems

Constitutional: Negative for appetite change, fatigue and fever.

Eyes: Negative for visual disturbance.

Respiratory: Negative.

Cardiovascular: Negative for palpitations and leg swelling.

Gastrointestinal: Negative for abdominal pain, constipation, diarrhea, nausea 
and vomiting.

Genitourinary: Negative.  Negative for dysuria, vaginal bleeding, vaginal 
discharge and pelvic pain.

Musculoskeletal: Negative.

Skin: Negative for rash.

Neurological: Negative for dizziness, light-headedness and headaches.

Psychiatric/Behavioral: Negative.



/71 (BP Location: Right arm, Patient Position: Sitting, BP CUFF SIZE: 
Adult Medium)  | Pulse 105  | Temp 36.4 C (97.5 F) (Oral)  | Resp 16  | Ht 5
' 5" (1.651 m)  | Wt 161 lb 2 oz (73.1 kg)  |LMP 2019 (Approximate)  | 
BMI 26.81 kg/m

Pregravid BMI: 24.6



Physical Exam

Vitals reviewed.

Constitutional: She is oriented to person, place, and time. She appears well-
developed and well-nourished.

See prenatal flowsheet

Cardiovascular: No peripheral edema present.

Pulmonary/Chest: Normal inspiratory effort.

Abdominal: Abdomen is soft.

Neuro/Psychiatric: She has a normal mood and affect. She is oriented to person, 
place, and time.

Skin: Skin normal.





Assessment/Plan



1. High-risk pregnancy in third trimester

28w0d

Labor precautions, FKC and PIH warnings reviewed.

28 week teaching, labs, and tdap today



- POCT URINALYSIS W/O SPECIFIC GRAVITY

- TDAP (ADACEL) IMMUNIZATION

- GALV ONLY - SYPHILIS IGG/IGM

- HIV 1/2 AG-AB WITH REFLEX



2. H/O cervical incompetence

On weekly Crosby injections



3. Current pregnancy with history of pre-term labor in second trimester

Denies s/s PTL



4. Rh negative, antepartum

Rhogam given today



- PRENATAL WORKUP, BLOOD BANK

- rho(D) immune globulin (RHOGAM) syringe 300 mcg



5. Fundal height low for dates in third trimester

Fundal height 25cm at 28 weeks

Growth scan ordered



- CONSULT MATERNAL FETAL MEDICINE ULTRASOUND Preferred Location: [facility]



Return to clinic in 1 for Jayna and in 2 week for prenatal visit weeks.

Reviewed patient instructions and provided printed copy.

Pregnancy at  28w0d



This visit did not involve counseling and coordination that comprised more than 
50% of the visit time.

Electronically signed by Ami Alcala FNP at 2020  3:42 PM 
CSTdocumented in this encounter



Plan of Treatment







 Date  Type  Specialty  Care Team  Description

 

 2020  Routine Prenatal Visit  OB Satellites  Ami Alcala FNP



  



       [address]



  



       [place] [address]



  



       [phone] (Fax)  









 Health Maintenance  Due Date  Last Done  Comments

 

 INFLUENZA VACCINE (#1)  2020    Postponed from



       2019 (Refused)

 

 PAP SMEAR  08/15/2022  08/15/2019, 2017,  



     2014, Additional  



     history exists  

 

 DTaP,Tdap,and Td Vaccines  2030, 2010  



 (2 - Td)      

 

 PNEUMOCOCCAL 0-64 YEARS  Aged Out    No longer eligible



 COMBINED SERIES      based on patient's age



       to complete this topic



documented as of this encounter



Procedures







 Procedure Name  Priority  Date/Time  Associated  Comments



       Diagnosis  

 

 POCT URINALYSIS W/O  Routine  2020  3:10  High-risk pregnancy  Results 
for this



 SPECIFIC GRAVITY    PM CST  in third trimester  procedure are in



         the results



         section.

 

 HB INDIRECT  Routine  2020  3:00  Rh negative,  Results for this



 ANTIGLOBULIN TEST    PM CST  antepartum  procedure are in



         the results



         section.

 

 GALV ONLY - SYPHILIS  Routine  2020  2:57  High-risk pregnancy  Results 
for this



 IGG/IGM    PM CST  in third trimester  procedure are in



         the results



         section.

 

 HIV 1/2 AG-AB WITH  Routine  2020  2:57  High-risk pregnancy  Results 
for this



 REFLEX    PM CST  in third trimester  procedure are in



         the results



         section.

 

 TDAP (ADACEL)  Routine  2020  2:53  High-risk pregnancy  



 IMMUNIZATION    PM CST  in third trimester  



documented in this encounter



Results

POCT URINALYSIS W/O SPECIFIC GRAVITY (2020  3:10 PM CST)





 Component  Value  Ref Range  Performed At  Pathologist Signature

 

 POCT PH U  .  5 - 8 mg/dl    

 

 POCT U LEUK EST  .  Negative - Negative    

 

 POCT U NIT  .  Negative - Negative    

 

 POCT U PROT  trace  Negative - Negative    

 

 POCT U GLU  negative  Negative - Negative    

 

 POCT U KETONE  ..  Negative - Negative    

 

 POCT U BLD  .  Negative - Negative    









 Specimen

 

 Urine - URINE, CLEAN CATCH



PRENATAL WORKUP, BLOOD BANK (2020  3:00 PM CST)





 Component  Value  Ref Range  Performed At  Pathologist Signature

 

 IAT  Negative    LAB  



   Comment:      



   Performed at [facility] Blood [place] [address]      



   Toll Free: [phone]      



   CLIA No. [id number]      



         

 

 ABO & RH  O NEGATIVE    LAB  



   Comment:      



   Performed at [facility] Blood [place] [id number]      



   Toll Free: [phone]      



   CLIA No. [id number]      



         









 Specimen

 

 Blood - VENOUS









 Performing Organization  Address  City/[facility]/Zipcode  Phone Number

 

 BLD      

 

 LAB      



HIV 1/2 AG-AB WITH REFLEX (2020  2:57 PM CST)





 Component  Value  Ref Range  Performed At  Pathologist Signature

 

 HIV 1/2 Ag-Ab with  Negative  Negative  [facility]  



 Reflex      SERVICES  

 

 HIV Semi-quantitative  0.06    [facility]  



       SERVICES  









 Specimen

 

 Blood - ARM, LEFT









 Narrative  Performed At

 

 Non-reactive for HIV-1 antigen and HIV-1/HIV-2 antibodies.  [facility] 
SERVICES



 No laboratory evidence of HIV infection. Repeat in 2-4  



 weeks if acute HIV infection is suspected.  









 Performing Organization  Address  City/[facility]  Phone Number

 

 [facility]  CLIA:  [id number], [address] [place] [address]  [phone]
[phone]



   [facility]    



GALV ONLY - SYPHILIS IGG/IGM (2020  2:57 PM CST)





 Component  Value  Ref Range  Performed At  Pathologist Signature

 

 Syphilis IgG/IgM  Non-reactive  Non-reactive  [facility]  



       SERVICES  









 Specimen

 

 Blood - VENOUS









 Narrative  Performed At

 

  



  [facility]



 Non-reactive - No serologic evidence of T. pallidum  



 infection. Cannot exclude incubating or early syphilis.  



 Submit a second specimen in 2-4 weeks if syphilis is  



 clinically suspected.



  



  



  



 Equivocal - Further testing to follow.



  



  



  



 Reactive - Further testing to follow.  









 Performing Organization  Address  City/[facility]  Phone Number

 

 [facility]  CLIA:  [id number], [address] [place] [address]  [phone]
[phone]



   [facility]    



documented in this encounter



Visit Diagnoses







 Diagnosis

 

 High-risk pregnancy in third trimester - Primary

 

 H/O cervical incompetence







 Personal history of other genital system and obstetric disorders

 

 Current pregnancy with history of pre-term labor in second trimester

 

 Rh negative, antepartum







 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

 

 Fundal height low for dates in third trimester



documented in this encounter



Administered Medications







 Medication Order  MAR Action  Action Date  Dose  Rate  Site

 

 HYDROXYprogest(PF)(preg  Given  2020  3:36 PM  250 mg    Right Upper 
Quad.



 presv) (JAYNA) 250    CST      Gluteus



 mg/mL (1 mL) injection          



 250 mg



          



 250 mg, Intramuscular,          



 QWEEKLY, 14 doses,          



 First dose on 19 at 1200, Last          



 dose on Mon 3/2/20 at          



 1200, Routine          









 Given  2020  3:02 PM CST  250 mg    Left Dorsogluteal-IM

 

 Given  2019 11:18 AM CST  250 mg    Left Dorsogluteal-IM









   









 









 Medication Order  MAR Action  Action Date  Dose  Rate  Site

 

 rho(D) immune globulin  Given  2020  3:35 PM  300 mcg    Left Upper Quad.



 (RHOGAM) syringe 300    CST      Gluteus



 mcg



          



 300 mcg, Intramuscular,          



 ONCE, 1 dose, Mon          



 20 at 1600,          



 Routine          









   



documented in this encounter



Insurance







 Payer  Benefit Plan /  Subscriber ID  Effective  Phone  Address  Type



   Group    Dates      

 

 [facility]  xxxxxxxxx  10/1/2019-Pres    [address]  Medicaid



 HEALTH CHOICE -  HEALTH CHOICE    ent    [id number]



  



 MANAGED  MEDICAID        [place]          [id number]  









 Guarantor Name  Account Type  Relation to  Date of  Phone  Billing



     Patient  Birth    Address

 

 Mellissa Tobar  Personal/Family  Self  1990  [phone]  [address] [place] [address] [address]        (Home)  [place] [address]



documented as of this encounter



Advance Directives







 Name  Relationship  Healthcare Agent Relationship  Communication

 

 Gerhard Tobar  Father  Primary healthcare agent  [phone]



       (Mobile)[phone] (Home)

## 2020-03-25 NOTE — XMS REPORT
Summary of Care

 Created on:2019



Patient:Mellissa Tobar

Sex:Female

:1990

External Reference #:[id number]





Demographics







 Address  [address]



   [place] [address]

 

 Mobile Phone  [phone]

 

 Home Phone  [phone]

 

 Phone  [phone]

 

 Email Address  [email or web address]

 

 Email Address  [email or web address]

 

 Preferred Language  English

 

 Marital Status  

 

 Mandaeism Affiliation  Unknown

 

 Race  White

 

 Ethnic Group  Not  or 









Author







 Organization  [facility]

 

 Address  [address] [place] [address]









Support







 Name  Relationship  Address  Phone

 

 Gerhard Tobar  Unavailable  [address]  [phone]



     [place] [address]  

 

 Cruz Contreras  Unavailable  [address]  [phone]



     [place] [address]  









Care Team Providers







 Name  Role  Phone

 

 Doctor Unassigned, No Name  Medicaid Hmo  Unavailable

 

 Faculty, Ang Rmchp Mfm  Unavailable  Unavailable

 

 Jayy Price FNP  Unavailable  [phone]

 

 Ami Alcala FNP  Primary Care Provider  [phone]









Reason for Referral

Radiology Services (STAT)





 Status  Reason  Specialty  Diagnoses /  Referred By  Referred To



       Procedures  Contact  Contact

 

 New Request    Diagnostic  Diagnoses



Vaginal spotting  Han Zapata,  



     Radiology  



Procedures



US PREGNANCY FIRST TRIMESTER LESS THAN 14 WEEKS WITH TRANSVAGINAL



US TRANSVAGINAL  MD



  



         [address] [place] [address]



  



         [address]



  



         [place]  



         [address]



  



         Phone:  



         [phone]



  



         Fax:  



         [phone]  









Reason for Visit







 Reason  Comments

 

 SPOTTING  10 wks

 

 UTI  



Auth/Cert





 Status  Reason  Specialty  Diagnoses /  Referred By  Referred To



       Procedures  Contact  Contact

 

     Emergency Medicine  Diagnoses



10 WKS PREG,BLEEDING ABD PAIN    [facility]



           Dept







           [address] [facility]



           [place]



           [address]







           Phone:



           [phone]







           Fax: [phone]









Encounter Details







 Date  Type  Department  Care Team  Description

 

 2019  Emergency  [facility]  Han Zapata MD



  Vaginal spotting (Primary Dx);



     Department



  [address] [facility]



  Threatened miscarriage



     [address] [facility] 



  [address]



  



     [place] [address]



  [place] [id number]



  



     [phone] (Fax)  







Allergies

No Known Allergiesdocumented as of this encounter (statuses as of 2019)



Medications







 Medication  Sig  Dispensed  Refills  Start Date  End Date  Status

 

 proMETHazine 25 mg  Take 1 tablet by  30 tablet  3  2019    Active



 tabletIndications:  mouth every 4          



 Nausea/vomiting in  (four) hours as          



 pregnancy  needed for Nausea          



   and Vomiting          



   (N/V).          

 

 PNV 67-iron ps-folate  Take 1 capsule by  30 capsule  11  2019    Active



 no.1-dha (VITAFOL  mouth daily.          



 ULTRA) 29 mg iron- 1            



 mg-200 mg            



 CapIndications: High            



 risk pregnancy,            



 antepartum            



documented as of this encounter (statuses as of 2019)



Active Problems







 Problem  Noted Date

 

 History of fetal anomaly in prior pregnancy, currently pregnant  08/15/2019

 

 High risk pregnancy, antepartum  2017

 

 Current pregnancy with history of pre-term labor in first trimester  2017

 

 Rh negative state in antepartum period, first trimester  2017

 

 H/O cervical incompetence  2017

 

 Recurrent pregnancy loss, antepartum condition or complication  2017

 

 History of PID  2017

 

 Fetal demise, less than 22 weeks  2014









 Pregnant  Estimated Date of Delivery  Comments

 

 Yes  2020  Based on last menstrual period of 2019



     (Approximate)



documented as of this encounter (statuses as of 2019)



Resolved Problems







 Problem  Noted Date  Resolved Date

 

 Supervision of pregnancy with other poor reproductive or  2017  08/15/
2019



 obstetric history, third trimester    

 

 Preg care for patient w recurrent preg loss, third trimester  2017  08/15
/2019

 

 Suprvsn of preg w history of pre-term labor, third trimester  2017  08/15
/2019

 

 Rh negative state in antepartum period  2017  08/15/2019

 

 Well woman exam with routine gynecological exam  2017

 

 Screening for STD (sexually transmitted disease)  2017

 

 History of anemia  2017  08/15/2019

 

 Other general counseling and advice for contraceptive  2017



 management    

 

 Threatened  in early pregnancy  2015

 

 Unsure of last menstrual period as reason for ultrasound  2015



 scan    

 

 History of cervical incompetence in pregnancy, currently  2015



 pregnant, first trimester    

 

 Rh negative state in antepartum period, first trimester,  2015



 fetus 1    

 

 Pregnancy with other poor reproductive history  07/10/2014  2017

 

 High-risk pregnancy  07/10/2014  2017

 

 History of  delivery, currently pregnant  2014









 Overview: 







 Delivered at 21 weeks









 Rh negative status during pregnancy, antepartum  2014



documented as of this encounter (statuses as of 2019)



Immunizations







 Name  Administration Dates  Next Due

 

 Rho (d) Immune Globulin  2019, 08/10/2017  

 

 Td  2010  



documented as of this encounter



Social History







 Tobacco Use  Types  Packs/Day  Years Used  Date

 

 Former Smoker  Cigarettes  0.1  1  Quit: 2014









 Smokeless Tobacco: Never Used      









 Alcohol Use  Drinks/Week  oz/Week  Comments

 

 No  0 Standard drinks or equivalent  0.0  









 Pregnant  Estimated Date of Delivery  Comments

 

 Yes  2020  Based on last menstrual period of 2019



     (Approximate)









 Sex Assigned at Birth  Date Recorded

 

 Not on file  









 Job Start Date  Occupation  Industry

 

 Not on file  Not on file  Not on file









 Travel History  Travel Start  Travel End









 No recent travel history available.



documented as of this encounter



Last Filed Vital Signs







 Vital Sign  Reading  Time Taken  Comments

 

 Blood Pressure  112/55  2019 11:15 AM CDT  

 

 Pulse  92  2019 11:15 AM CDT  

 

 Temperature  36.4 C (97.5 F)  2019  9:15 AM CDT  

 

 Respiratory Rate  18  2019 11:15 AM CDT  

 

 Oxygen Saturation  96%  2019 11:15 AM CDT  

 

 Inhaled Oxygen Concentration  -  -  

 

 Weight  68 kg (150 lb)  2019  9:15 AM CDT  

 

 Height  165.1 cm (5' 5")  2019  9:15 AM CDT  

 

 Body Mass Index  24.96  2019  9:15 AM CDT  



documented in this encounter



Discharge Instructions

InstructionsNeHan arambula MD - 2019Formatting of this note might be 
different from the original.

RETURN FOR ANY QUESTIONS OR CONCERNS



Today you were seen by Han Zapata Jr., MD



You were seen today for

Chief Complaint

Patient presents with

 SPOTTING

  10 wks

 UTI



Your ER diagnosis was

  ICD-10-CM ICD-9-CM

1. Vaginal spotting N93.9 623.8

2. Threatened miscarriage O20.0 640.00



NO LIFE-THREATENING FINDINGS ON TODAY'S EXAM.



YOUR PRESCRIPTIONS :

 Check out [employer] for medication discounts



Medication List





ASK your doctor about these medications



PNV 67-iron ps-folate no.1-dha 29 mg iron- 1 mg-200 mg Cap

Commonly known as:  VITAFOL ULTRA

Take 1 capsule by mouth daily.



proMETHazine 25 mg tablet

Commonly known as:  PHENERGAN

Take 1 tablet by mouth every 4 (four) hours as needed for Nausea and Vomiting (N
/V).









ER precautions and follow up :

1.  Return to ER if your symptoms should worsen or fail to improve within 72 
hours.



2.  The care provided in the emergency room was for acute problems only.



3.  You should follow up with your primary care provider within 72 hours.



4.  Fill and take all your medications as prescribed.



5.  Make sure you are staying adequately hydrated.



Busque attencion immediatamente si usted tiene los sitomas sigue, vuelve peor o 
si hay sitomas nuevas o para cualquiera preoccupacion incluyendo dolor del pecho
, falta aire, se siente debile, mas fievre, mas dolor, nausea, vomitando, 
sangrando que no es normal, confusion, baja or pierdas conciencia.



MAY FOLLOW-UP WITH A PROVIDER OF YOUR CHOICE, SUCH AS:

1. A PHYSICIAN OF YOUR CHOICE



2. [facility], [phone]. LOCATIONS IN [facility]



3. [facility], [address] [place] [address]; [phone]
[phone]



OR, IF YOU WISH TO FOLLOW-UP WITHIN THE [facility], MAY TRY THESE 
OPTIONS (CLINIC APPOINTMENTS AVAILABLE ON CASE-BY-CASE BASIS):

1. SCHEDULE AN APPOINTMENT ONLINE AT WWW.[facility]

2. OR CALL THE [facility] ACCESS CENTER AT [phone] OR [phone]

3. OR CALL YOUR [facility] PHYSICIAN'S OFFICE DIRECTLY IF YOU ARE ALREADY AN 
ESTABLISHED [facility] PATIENT.



[facility]

RETURN TO WORK / SCHOOL EXCUSE



Mellissa Tobar WAS SEEN IN THE ER AND DISCHARGED 2019

TODAY, 11:42 AM  &amp;

May return to Work / School / Incarceration on X with activity as tolerated 
indicated below.



___The following limitations apply until pt is seen by Physician and cleared to 
return to normal activity.

_X_ Off for two days and return to activity as tolerated at work or school

___ No Sports

___ No work

___ Do not return until fever free for 24 hours.

___ No school



HAN ZAPATA Jr., MD

[facility] EMERGENCY DEPRTMENT

[address] [place] [address] [address] [place] [address]



###  The patient may have been given Narcotic pain medications during their 
stay in the ED that may show up on a Drug Screen. The hospital discharge paper 
work will identify these medications.





AttachmentsThe following attachments cannot be sent through Care 
Everywhere.Possible Miscarriage (Threatened ) (English)documented in 
this encounter



Plan of Treatment







 Date  Type  Specialty  Care Team  Description

 

 2019  Routine Prenatal Visit  OB Satellites  Ami Alcala, FNP



  



       [address] [place] [address]



  



       [phone] (Fax)  









 Health Maintenance  Due Date  Last Done  Comments

 

 INFLUENZA VACCINE (#1)  2019    

 

 DTaP,Tdap,and Td Vaccines  2019  Postponed from



 (1 - Tdap)      2010 (Pregnant or



       Breastfeeding)

 

 PAP SMEAR  08/15/2022  08/15/2019, 2017,  



     2014, Additional  



     history exists  

 

 PNEUMOCOCCAL 0-64 YEARS  Aged Out    No longer eligible



 COMBINED SERIES      based on patient's age



       to complete this topic



documented as of this encounter



Procedures







 Procedure Name  Priority  Date/Time  Associated  Comments



       Diagnosis  

 

 US PREGNANCY FIRST  STAT  2019 11:06  Vaginal spotting  Results for this



 TRIMESTER LESS THAN    AM CDT    procedure are in



 14 WEEKS WITH        the results



 TRANSVAGINAL        section.

 

 RHO (D) IMMUNE  Routine  2019  9:50  Vaginal spotting  Results for this



 GLOBULIN    AM CDT    procedure are in



         the results



         section.

 

 TYPE AND SCREEN  STAT  2019  9:50  Vaginal spotting  Results for this



     AM CDT    procedure are in



         the results



         section.

 

 RH IMMUNE GLOBULIN  Routine  2019  9:50    Results for this



 REQUIRED?    AM CDT    procedure are in



         the results



         section.

 

 URINALYSIS  STAT  2019  9:17  Vaginal spotting  Results for this



     AM CDT    procedure are in



         the results



         section.

 

 NOTICE OF PRIVACY  Routine  2019  9:06    



 PRACTICES    AM CDT    



documented in this encounter



Results

US PREGNANCY FIRST TRIMESTER LESS THAN 14 WEEKS WITH TRANSVAGINAL (2019 11
:06 AM CDT)





 Specimen

 

 









 Narrative  Performed At

 

 HISTORY: Vaginal bleeding. Rule out fetal demise.



  PACS/VR/DOSE



  



  



 TECHNIQUE: Both transabdominal and transvaginal pelvic ultrasound  



 studies



  



 were completed by the technologist.



  



  



  



 FINDINGS: Comparison is made with recent ultrasound study of 2019.



  



 Single live IUP of 9 weeks and 6 days size confirmed by CRL measurement  



 of



  



 31.82 mm and mean sac diameter of 3.87 cm.



  



  



  



 A yolk sac, fetal pole and fetal cardiac activity visualized with fetal



  



 heart rate recorded at 170 BPM.



  



  



  



 Right ovary is 3.4 x 2.4 x 2.4 cm (11.08 ml) and left ovary is 2.9 x 1.3  



 x



  



 2.4 cm (5.07 ml). Right ovary contains a 1.8 cm size corpus luteum



  



 hemorrhagicum.



  



  



  



 CONCLUSIONS: 



  



 1. 1.8 x 1.1 cm subchorionic hemorrhage noted along the lower left side  



 of



  



 the gestational sac.



  



 2. Single live IUP of 9 weeks and 6 days size confirmed with fetal heart



  



 rate of 170 BPM recorded.



  



  



  



  



  



  



  



  



  



   









 Procedure Note

 

 [facility], Radiant Results Inft User - 2019 11:11 AM CDT



HISTORY: Vaginal bleeding. Rule out fetal demise.



 



 TECHNIQUE: Both transabdominal and transvaginal pelvic ultrasound studies



 were completed by the technologist.



 



 FINDINGS: Comparison is made with recent ultrasound study of 2019.



 Single live IUP of 9 weeks and 6 days size confirmed by CRL measurement of



 31.82 mm and mean sac diameter of 3.87 cm.



 



 A yolk sac, fetal pole and fetal cardiac activity visualized with fetal



 heart rate recorded at 170 BPM.



 



 Right ovary is 3.4 x 2.4 x 2.4 cm (11.08 ml) and left ovary is 2.9 x 1.3 x



 2.4 cm (5.07 ml). Right ovary contains a 1.8 cm size corpus luteum



 hemorrhagicum.



 



 CONCLUSIONS:



 1. 1.8 x 1.1 cm subchorionic hemorrhage noted along the lower left side of



 the gestational sac.



 2. Single live IUP of 9 weeks and 6 days size confirmed with fetal heart



 rate of 170 BPM recorded.









 Performing Organization  Address  City/State/Zipcode  Phone Number

 

 PACS/VR/DOSE      



RH IMMUNE GLOBULIN REQUIRED? (2019  9:50 AM CDT)





 Component  Value  Ref Range  Performed At  [facility]

 

 RHIG REQUIRED?  1 Syringe    LAB  



   Comment:      



   Performed at [facility]      



   [address] [place] [address]      



   Toll Free: [phone]      



   CLIA No. [id number]      



         









 Specimen

 

 









 Performing Organization  Address  City/[facility]/Zipcode  Phone Number

 

 BLD      

 

 LAB      



Type and Screen - ONCE STAT (2019  9:50 AM CDT)





 Component  Value  Ref Range  Performed At  [facility]

 

 ABO & RH  O Negative    LAB  



   Comment:      



   Performed at [facility]      



   [address] [place] [address]      



   Toll Free: [phone]      



   CLIA No. [id number]      



         

 

 IAT  Negative    LAB  



   Comment:      



   Performed at [facility]      



   [address] [place] [address]      



   Toll Free: [phone]      



   CLIA No. [id number]      



         









 Specimen

 

 Blood - VENOUS









 Performing Organization  Address  City/[facility]  Phone Number

 

 BLD      

 

 LAB      



RHO (D) IMMUNE GLOBULIN (2019  9:50 AM CDT)





 Component  Value  Ref Range  Performed At  [facility]

 

 RHIG CANDIDATE?  Yes- see comment (A)    LAB  



   Comment:      



   Luis is a candidate for RhIg- Paitient is Rh Negative and currently      



   pregnant.      



   Performed at [facility]      



   [address] [place] [address]      



   Toll Free: [phone]      



   CLIA No. [id number]      



         









 Specimen

 

 Blood - VENOUS









 Performing Organization  Address  City/[facility]/Zipcode  Phone Number

 

 BLD      

 

 LAB      



Urinalysis (2019  9:17 AM CDT)





 Component  Value  Ref Range  Performed At  [facility]

 

 APPEARANCE  Clear  Clear  [facility]  



       LABORATORY  

 

 COLOR  Yellow  Yellow  [facility]  



       LABORATORY  

 

 PH  6.5  4.8 - 8.0  [facility]  



       LABORATORY  

 

 SP GRAVITY  1.020  1.003 - 1.030  [facility]  



       LABORATORY  

 

 GLU U QUAL  Negative  Negative  [facility]  



       LABORATORY  

 

 BLOOD  Trace (A)  Negative  [facility]  



       LABORATORY  

 

 KETONES  Negative  Negative  [facility]  



       LABORATORY  

 

 PROTEIN  Negative  Negative  [facility]  



       LABORATORY  

 

 UROBILIN  0.2 mg/dL  0-1.0 mg/dL  [facility]  



       LABORATORY  

 

 BILIRUBIN  Negative  Negative  [facility]  



       LABORATORY  

 

 NITRITE  Negative  Negative  [facility]  



       LABORATORY  

 

 LEUK BRITTNEY  Negative  Negative  [facility]  



       LABORATORY  

 

 RBC/HPF  0  0 - 3 HPF  [facility]  



       LABORATORY  

 

 WBC/HPF  2  0 - 5 HPF  [facility]  



       LABORATORY  

 

 BACTERIA  Few (A)  Negative  [facility]  



       LABORATORY  

 

 MUCOUS  Slight (A)  Negative LPF  [facility]  



       LABORATORY  

 

 SQ EPITH  2  HPF  [facility]  



       LABORATORY  









 Specimen

 

 Urine - URINE, CLEAN CATCH









 Performing Organization  Address  City/State/[facility]  Phone Number

 

 [facility]  CLIA: [id number], [address]  [place] [address]  



 LABORATORY  [address]    



documented in this encounter



Visit Diagnoses







 Diagnosis

 

 Vaginal spotting - Primary







 Other specified noninflammatory disorder of vagina

 

 Threatened miscarriage







 Threatened , unspecified as to episode of care



documented in this encounter



Administered Medications







 Medication Order  MAR Action  Action Date  Dose  Rate  Site









 rho(D) immune globulin (RHOGAM) syringe 300 mcg



  



 300 mcg, Intramuscular, ONCE, For 1 dose, Conditional, Routine  

 

   









 









 Medication Order  MAR Action  Action Date  Dose  Rate  Site

 

 metoclopramide HCl (REGLAN)  Given  2019 11:42 AM CDT  10 mg    



 injection 10 mg



          



 10 mg, Slow IV Push, ONCE, 1 dose,          



 19 at 1245, ASAP          









   



documented in this encounter



Insurance







 Payer  Benefit Plan /  Subscriber ID  Effective Dates  Phone  Address  Type



   Group          

 

 [facility]ILDR[facility]  xxxxxxxxx  2019-Present      Medicaid



 HEALTH PLAN -  HEALTH          



 MANAGED MEDICAID            









 Guarantor Name  Account Type  Relation to  Date of  Phone  Billing



     Patient  Birth    Address

 

 Mellissa Tobar  Personal/Family  Self  1990  [phone]  [address] [place] [address] [address]        (Home)  [place] [address]



documented as of this encounter



Advance Directives







 Name  Relationship  Healthcare Agent Relationship  Communication

 

 Gerhard Harrell  Primary healthcare agent  [phone]



       (Mobile)[phone] (Home)

## 2020-03-25 NOTE — XMS REPORT
Summary of Care

 Created on:2020



Patient:Mellissa Tobar

Sex:Female

:1990

External Reference #:[id number]





Demographics







 Address  [address]



   [place] [address]

 

 Mobile Phone  [phone]

 

 Home Phone  [phone]

 

 Phone  [phone]

 

 Email Address  [email or web address]

 

 Email Address  [email or web address]

 

 Preferred Language  English

 

 Marital Status  

 

 Taoist Affiliation  Unknown

 

 Race  White

 

 Ethnic Group  Not  or 









Author







 Organization  [facility]

 

 Address  [address] [place] [id number]









Support







 Name  Relationship  Address  Phone

 

 Gerhard Tobar  Unavailable  [address]   [phone]



     [place] [address]  

 

 Cruz Contreras  Unavailable  [id number]  [phone]



     [place] [address]  









Care Team Providers







 Name  Role  Phone

 

 Doctor Unassigned, No Name  Medicaid [facility]mo  Unavailable

 

 Ernestina, Ashish Linderdede [facility]  Unavailable  Unavailable

 

 Jayy Price [facility]  Unavailable  [phone]

 

 Ami Alcala  Primary Care Provider  [phone]









Reason for Visit







 Reason  Comments

 

 NURSE VISIT  







Encounter Details







 Date  Type  Department  Care Team  Description

 

 2020  Nurse Visit  [facility]  Ami Alcala, FNP



[address] [place] [address] [phone] (Fax)  History of 



     [place]



  



Visit, [facility] Nurse  labor (Primary Dx)



     [address] [place], [place]    



     [address] [phone]    







Allergies

No Known Allergiesdocumented as of this encounter (statuses as of 2020)



Medications







 Medication  Sig  Dispensed  Refills  Start Date  End Date  Status

 

 PNV 67-iron ps-folate  Take 1 capsule by  30 capsule  11  2019    Active



 no.1-dha (VITAFOL  mouth daily.          



 ULTRA) 29 mg iron- 1            



 mg-200 mg            



 CapIndications: High            



 risk pregnancy,            



 antepartum            

 

 proMETHazine 25 mg  Take 1 tablet by  30 tablet  3  2019    Active



 tabletIndications:  mouth every 4          



 Nausea/vomiting in  (four) hours as          



 pregnancy  needed for Nausea          



   and Vomiting          



   (N/V).          

 

 HYDROXYprogest,PF,,pre      0  2019    Active



 g presv, 250 mg/mL (1            



 mL) injection            









 Hospital, Clinic, or Other  Ordered Dose  Route  Frequency  Start Date  End 
Date  Status



 Facility Administered            



 Medication            

 

 HYDROXYprogest(PF)(preg  250 mg  IM  QWEEKLY  2019  Active



 presv) (JAYNA) 250 mg/mL            



 (1 mL) injection 250 mg            



documented as of this encounter (statuses as of 2020)



Active Problems







 Problem  Noted Date

 

 Rh negative, antepartum  10/23/2019

 

 History of fetal anomaly in prior pregnancy, currently pregnant  08/15/2019

 

 High risk pregnancy, antepartum  2017

 

 Current pregnancy with history of pre-term labor in third trimester  2017

 

 H/O cervical incompetence  2017

 

 Recurrent pregnancy loss, antepartum condition or complication  2017

 

 History of PID  2017

 

 Fetal demise, less than 22 weeks  2014









 Pregnant  Estimated Date of Delivery  Comments

 

 Yes  2020  Based on last menstrual period of 2019



     (Approximate)



documented as of this encounter (statuses as of 2020)



Resolved Problems







 Problem  Noted Date  Resolved Date

 

 H/O  delivery, currently pregnant, second trimester  10/10/2019  12/30/
2019

 

 Supervision of pregnancy with other poor reproductive or  2017  08/15/
2019



 obstetric history, third trimester    

 

 Preg care for patient w recurrent preg loss, third trimester  2017  08/15
/2019

 

 Suprvsn of preg w history of pre-term labor, third trimester  2017  08/15
/2019

 

 Rh negative state in antepartum period  2017  08/15/2019

 

 Rh negative state in antepartum period, first trimester  2017

 

 Well woman exam with routine gynecological exam  2017

 

 Screening for STD (sexually transmitted disease)  2017

 

 History of anemia  2017  08/15/2019

 

 Other general counseling and advice for contraceptive  2017



 management    

 

 Threatened  in early pregnancy  2015

 

 Unsure of last menstrual period as reason for ultrasound  2015



 scan    

 

 History of cervical incompetence in pregnancy, currently  2015



 pregnant, first trimester    

 

 Rh negative state in antepartum period, first trimester,  2015



 fetus 1    

 

 Pregnancy with other poor reproductive history  07/10/2014  2017

 

 High-risk pregnancy  07/10/2014  2017

 

 History of  delivery, currently pregnant  2014









 Overview: 







 Delivered at 21 weeks









 Rh negative status during pregnancy, antepartum  2014



documented as of this encounter (statuses as of 2020)



Immunizations







 Name  Administration Dates  Next Due

 

 Rho (d) Immune Globulin  2020, 2019, 08/10/2017  

 

 TDAP (ADACEL) VACCINE  2020  

 

 Td  2010  



documented as of this encounter



Social History







 Tobacco Use  Types  Packs/Day  Years Used  Date

 

 Former Smoker  Cigarettes  0.1  1  Quit: 2014









 Smokeless Tobacco: Never Used      









 Alcohol Use  Drinks/Week  oz/Week  Comments

 

 No  0 Standard drinks or equivalent  0.0  









 Pregnant  Estimated Date of Delivery  Comments

 

 Yes  2020  Based on last menstrual period of 2019



     (Approximate)









 Sex Assigned at Birth  Date Recorded

 

 Not on file  









 Job Start Date  Occupation  Industry

 

 Not on file  Not on file  Not on file









 Travel History  Travel Start  Travel End









 No recent travel history available.



documented as of this encounter



Last Filed Vital Signs







 Vital Sign  Reading  Time Taken  Comments

 

 Blood Pressure  105/70  2020 10:13 AM CST  

 

 Pulse  106  2020 10:13 AM CST  

 

 Temperature  37.1 C (98.8 F)  2020 10:13 AM CST  

 

 Respiratory Rate  16  2020 10:13 AM CST  

 

 Oxygen Saturation  -  -  

 

 Inhaled Oxygen Concentration  -  -  

 

 Weight  74.6 kg (164 lb 6 oz)  2020 10:13 AM CST  

 

 Height  165.1 cm (5' 5")  2020 10:13 AM CST  

 

 Body Mass Index  27.35  2020 10:13 AM CST  



documented in this encounter



Patient Instructions

Patient InstructionsLissy Oleary LVN - 2020 10:00 AM CST

Hydroxyprogesterone solution for injection

Brand Name: Jayna

What is this medicine?

HYDROXYPROGESTERONE (wily drox ee proe ZELALEM ter one) is a female hormone. This 
medicine is used in women who are pregnant and who have delivered a baby too 
early () in the past. It helps lower therisk of having a  baby 
again. This medicine is also used to treat irregular menstrual bleeding or a 
lack of menstrual bleeding in women. In some cases, it may be used to treat 
endometrial cancer.

How should I use this medicine?

This medicine is for injection into a muscle or under the skin. It is given by 
a health care professional in a hospital or clinic setting.

Talk to your pediatrician regarding the use of this medicine in children. 
Special care may be needed.

What side effects may I notice from receiving this medicine?

Side effects that you should report to your doctor or health care professional 
as soon as possible:

 allergic reactions like skin rash, itching or hives, swelling of the face, 
lips, or tongue

 breathing problems

 depressed mood

 increase in blood pressure

 increased hunger or thirst

 increased urination

 signs and symptoms of a blood clot such as breathing problems; changes in 
vision; chest pain; severe, sudden headache; pain, swelling, warmth in the leg; 
trouble speaking; sudden numbness or weakness of the face, arm or leg

 unusually weak or tired

 unusual vaginal bleeding

 yellowing of the eyes or skin

Side effects that usually do not require medical attention (report to your 
doctor or health care professional if they continue or are bothersome):

 diarrhea

 fluid retention and swelling

 nausea

 pain, redness, or irritation at site where injected

What may interact with this medicine?

Significant interactions are not expected.

What if I miss a dose?

It is important not to miss your dose. Call your doctor or health care 
professional if you are unable to keep an appointment.

Where should I keep my medicine?

This drug is given in a hospital or clinic and will not be stored at home.

What should I tell my health care provider before I take this medicine?

They need to know if you have any of these conditions:

 breast, cervical, uterine, or vaginal cancer

 depression

 diabetes or prediabetes

 heart disease

 high blood pressure

 history of blood clots

 kidney disease

 liver disease

 lung or breathing disease, like asthma

 migraine headaches

 seizures

 vaginal bleeding

 an unusual or allergic reaction to hydroxyprogesterone, other hormones, 
castor oil, other medicines, foods, dyes, or preservatives

 breast-feeding

What should I watch for while using this medicine?

Your condition will be monitored carefully while you are receiving this 
medicine.

NOTE:This sheet is a summary. It may not cover all possible information. If you 
have questions aboutthis medicine, talk to your doctor, pharmacist, or health 
care provider. Copyright 2018 [employer]



Electronically signed by Lissy Oleary LVN at 2020 10:12 AM CST

documented in this encounter



Progress Notes

Lissy Oleary LVN - 2020 10:00 AM CST30 year old female in clinic 
today for Jayna progesterone injection. 250 mg administered IM to leftgluteus- 
see MAR entry. Witnessed by YUE Vasquez RN.  Medication supplied by outside 
pharmacy.  Pt tolerated injection well, warning signs discussed with her at 
this time. Pt instructed to RTC 1 wk for next dose or PRN. Pt verbalized 
understanding.

Lissy Golden LVN  2020  10:33 AM



Electronically signed by Lissy Oleary LVN at 2020 10:33 AM 
CSTdocumented in this encounter



Plan of Treatment







 Date  Type  Specialty  Care Team  Description

 

 2020  Routine Prenatal Visit  [facility]  Ami Alcala, FNP



  



       [address]



  



       [place] [address]



  



       [phone] (Fax)  









 Health Maintenance  Due Date  Last Done  Comments

 

 INFLUENZA VACCINE (#1)  2020    Postponed from



       2019 (Refused)

 

 PAP SMEAR  08/15/2022  08/15/2019, 2017,  



     2014, Additional  



     history exists  

 

 DTaP,Tdap,and Td Vaccines  2030, 2010  



 (2 - Td)      

 

 PNEUMOCOCCAL 0-64 YEARS  Aged Out    No longer eligible



 COMBINED SERIES      based on patient's age



       to complete this topic



documented as of this encounter



Results

Not on filedocumented in this encounter



Visit Diagnoses







 Diagnosis

 

 History of  labor - Primary







 Personal history of pre-term labor



documented in this encounter



Administered Medications







 Medication Order  MAR Action  Action Date  Dose  Rate  Site

 

 HYDROXYprogest(PF)(preg  Given  2020 10:33 AM  250 mg    Left Upper Quad.



 presv) (JAYNA) 250    CST      Gluteus



 mg/mL (1 mL) injection          



 250 mg



          



 250 mg, Intramuscular,          



 QWEEKLY, 14 doses,          



 First dose on Mon          



 19 at 1200, Last          



 dose on Mon 3/2/20 at          



 1200, Routine          









 Given  2020  9:30 AM CST  250 mg    Right Dorsogluteal-IM

 

 Given  2020  2:50 PM CST  250 mg    Left Upper Quad. Gluteus









   



documented in this encounter



Insurance







 Payer  Benefit Plan /  Subscriber ID  Effective  Phone  Address  Type



   Group    [facility]  xxxxxxxxx  10/1/2019-Pres    [address]  Medicaid



 HEALTH CHOICE -  HEALTH CHOICE    [facility]    [id number]



  



 MANAGED MEDICAID [place]          [id number]  









 Guarantor Name  Account Type  Relation to  Date of  Phone  Billing



     Patient  Birth    Address

 

 eMllissa Tobar  Personal/Family  Self  1990  [phone]  [address] [place] [address] [address]        (Home)  [place] [address]



documented as of this encounter



Advance Directives







 Name  Relationship  Healthcare Agent Relationship  Communication

 

 Gerhard Harrell  Primary healthcare agent  [phone]



       (Mobile)[phone] (Home)

## 2020-03-25 NOTE — P.BOP
Preoperative diagnosis: 38wk pregnancy, SPROM


Postoperative diagnosis: Same, delivered


Primary procedure: SCVD viable female infant


Secondary procedure: repair second degree ml laceration


Anesthesia: epidural


Complications: None


Transferred to: Other (273)


Condition: Good

## 2020-03-25 NOTE — XMS REPORT
Summary of Care

 Created on:2019



Patient:Mellissa Tobar

Sex:Female

:1990

External Reference #:[id number]





Demographics







 Address  [address] 



   [place] [address]

 

 Mobile Phone  [phone]

 

 Home Phone  [phone]

 

 Phone  [phone]

 

 Email Address  [email or web address]

 

 Email Address  [email or web address]

 

 Preferred Language  English

 

 Marital Status  

 

 Adventism Affiliation  Unknown

 

 Race  White

 

 Ethnic Group  Not  or 









Author







 Organization  [facility]

 

 Address  [address] [place] [address]









Support







 Name  Relationship  Address  Phone

 

 Gerhard Tobar  Unavailable  [address]  [phone]



     [place] [address]  

 

 Cruz Contreras  Unavailable  [address]  [phone]



     [place] [address]  









Care Team Providers







 Name  Role  Phone

 

 Doctor Unassigned, No Name  Medicaid Hmo  Unavailable

 

 Faculty, Ashish Rmchp Mfm  Unavailable  Unavailable

 

 Jayy Price FNP  Unavailable  [phone]

 

 Ami Alcala FNP  Primary Care Provider  [phone]









Reason for Referral

Radiology Services (ASAP)





 Status  Reason  Specialty  Diagnoses /  Referred By  Referred To



       Procedures  Contact  Contact

 

 New Request    Diagnostic  Diagnoses



Abdominal pain affecting pregnancy  Clinton Pimentel  



     Radiology  



Procedures



[facility] TRAN[facility]AG[facility]AL  III, [facility]



  



         [address] [place] [address] [address] [place], [place]  



         [address]



  



         Phone:  



         [phone]



  



         Fax:  



         [phone]  





Radiology Services (ASAP)





 Status  Reason  Specialty  Diagnoses /  Referred By  Referred To



       Procedures  Contact  Contact

 

 New Request    Diagnostic  Diagnoses



Abdominal pain affecting pregnancy  Clinton Pimentel  



     Radiology  



Procedures



[facility] TRAN[facility]AGINAL  III, PA



  



         [address] [place] [address] [address] [place], [place]  



         [address]



  



         Phone:  



         [phone]



  



         Fax:  



         [phone]  









Reason for Visit







 Reason  Comments

 

 CRAMPING  



Auth/Cert





 Status  Reason  Specialty  Diagnoses /  Referred By  Referred To



       Procedures  Contact  Contact

 

     Emergency Medicine      [facility]



           Dept







           [address] [place] [address]



           [place]



           [address]







           Phone:



           [phone]







           Fax: [phone]









Encounter Details







 Date  Type  Department  Care Team  Description

 

 2019  Emergency  [facility]  Margarito, Clinton A III,  Abdominal pain 
affecting pregnancy (Primary Dx);



     Department



  PA



  Urinary tract infection without hematuria, site unspecified



     [address] [facility] 



  [address] [facility] [address]



  [facility] [place] [address] [phone] (Fax)  







Allergies

No Known Allergiesdocumented as of this encounter (statuses as of 2019)



Medications







 Medication  Sig  Dispensed  Refills  Start Date  End Date  Status

 

 proMETHazine 25 mg  Take 1 tablet by  30 tablet  3  2019    Active



 tabletIndications:  mouth every 4          



 Nausea/vomiting in  (four) hours as          



 pregnancy  needed for          



   Nausea and          



   Vomiting (N/V).          

 

 PNV 67-iron ps-folate  Take 1 capsule  30 capsule  11  2019    Active



 no.1-dha (VITAFOL  by mouth daily.          



 ULTRA) 29 mg iron- 1            



 mg-200 mg            



 CapIndications: High            



 risk pregnancy,            



 antepartum            

 

 amoxicillin 500 mg  Take 1 tablet by  30 tablet  0  2019  
Active



 tabletIndications:  mouth 3 (three)          



 Abdominal pain  times daily for          



 affecting pregnancy,  10 days.          



 Urinary tract            



 infection without            



 hematuria, site            



 unspecified            



documented as of this encounter (statuses as of 2019)



Active Problems







 Problem  Noted Date

 

 History of fetal anomaly in prior pregnancy, currently pregnant  08/15/2019

 

 High risk pregnancy, antepartum  2017

 

 Current pregnancy with history of pre-term labor in first trimester  2017

 

 Rh negative state in antepartum period, first trimester  2017

 

 H/O cervical incompetence  2017

 

 Recurrent pregnancy loss, antepartum condition or complication  2017

 

 History of PID  2017

 

 Fetal demise, less than 22 weeks  2014









 Pregnant  Estimated Date of Delivery  Comments

 

 Yes  2020  Based on last menstrual period of 2019



     (Approximate)



documented as of this encounter (statuses as of 2019)



Resolved Problems







 Problem  Noted Date  Resolved Date

 

 Supervision of pregnancy with other poor reproductive or  2017  08/15/
2019



 obstetric history, third trimester    

 

 Preg care for patient w recurrent preg loss, third trimester  2017  08/15
/2019

 

 Suprvsn of preg w history of pre-term labor, third trimester  2017  08/15
/2019

 

 Rh negative state in antepartum period  2017  08/15/2019

 

 Well woman exam with routine gynecological exam  2017

 

 Screening for STD (sexually transmitted disease)  2017

 

 History of anemia  2017  08/15/2019

 

 Other general counseling and advice for contraceptive  2017



 management    

 

 Threatened  in early pregnancy  2015

 

 Unsure of last menstrual period as reason for ultrasound  2015



 scan    

 

 History of cervical incompetence in pregnancy, currently  2015



 pregnant, first trimester    

 

 Rh negative state in antepartum period, first trimester,  2015



 fetus 1    

 

 Pregnancy with other poor reproductive history  07/10/2014  2017

 

 High-risk pregnancy  07/10/2014  2017

 

 History of  delivery, currently pregnant  2014









 Overview: 







 Delivered at 21 weeks









 Rh negative status during pregnancy, antepartum  2014



documented as of this encounter (statuses as of 2019)



Immunizations







 Name  Administration Dates  Next Due

 

 Rho (d) Immune Globulin  08/10/2017  

 

 Td  2010  



documented as of this encounter



Social History







 Tobacco Use  Types  Packs/Day  Years Used  Date

 

 Former Smoker  Cigarettes  0.1  1  Quit: 2014









 Smokeless Tobacco: Never Used      









 Alcohol Use  Drinks/Week  oz/Week  Comments

 

 No  0 Standard drinks or equivalent  0.0  









 Pregnant  Estimated Date of Delivery  Comments

 

 Yes  2020  Based on last menstrual period of 2019



     (Approximate)









 Sex Assigned at Birth  Date Recorded

 

 Not on file  









 Job Start Date  Occupation  Industry

 

 Not on file  Not on file  Not on file









 Travel History  Travel Start  Travel End









 No recent travel history available.



documented as of this encounter



Last Filed Vital Signs







 Vital Sign  Reading  Time Taken  Comments

 

 Blood Pressure  110/65  2019  9:44 PM CDT  

 

 Pulse  80  2019  9:44 PM CDT  

 

 Temperature  37 C (98.6 F)  2019  9:44 PM CDT  

 

 Respiratory Rate  16  2019  9:44 PM CDT  

 

 Oxygen Saturation  98%  2019  9:44 PM CDT  

 

 Inhaled Oxygen Concentration  -  -  

 

 Weight  68 kg (150 lb)  2019  7:34 PM CDT  

 

 Height  -  -  

 

 Body Mass Index  24.96  08/15/2019  9:38 AM CDT  



documented in this encounter



Discharge Instructions

Clinton Cano III, PA - 2019Formatting of this note might 
be different from the original.





@@@@@@@@@@@@@@@@@@@@@@@@@@@@@@@@@@@@@@@@@@@@@@@@@@@@@

[facility] [[email or web address]] HEALTH

RETURN TO WORK / SCHOOL EXCUSE



Mellissa Tobar WAS SEEN IN THE ER AND DISCHARGED 2019

TODAY, 9:27 PM  &amp;

May return to Work / School / Incarceration on 19 with No limitations 
unless indicated below.

___The following limitations apply until pt is seen by Physician and cleared to 
return to normal activity.

___ Light duty

___ No Sports

___ No work

___ Do not return until fever free for 24 hours.

___ No school



Ed Margarito LINARES

[facility]

[address] [place] [address] [place] [address]



If you are unprepared to return to work tomorrow due to pain please give this 
note to your employer and make a follow up appointment with your MD for further 
evaluation and limitations.



###  The patient may have been given Narcotic pain medications during their 
stay in the ED that may show up on a Drug Screen. The hospital discharge paper 
work will identify these medications.



@@@@@@@@@@@@@@@@@@@@@@@@@@@@@@@@@@@@@@@@@@@@@@@@@@@@@



Thank you for trusting us with your care.



The emergency room is the first stop in the medical management of your 
complaint .



Our primary pupose is to identify life threatening emergancies and to rapidly 
address those issues.



We are releasing you today after evaluation for emergency or life threatening 
problems related to your complaint. At this time we are comfortable that your 
condition is stable enough to go home, take oral medications and follow up for 
further care.



If you can't afford a doctor OR MEDICATIONS consider

[facility], [address] [place] [address]; [phone]



Medications

[facility] WILL SHOW YOU WHERE YOU CAN GET YOUR MEDICATIONS CHEAPEST.



1. Call your doctor and let them know you were seen for

  ICD-10-CM ICD-9-CM

1. Abdominal pain affecting pregnancy O26.899 646.80

 R10.9 789.00

2. Urinary tract infection without hematuria, site unspecified N39.0 599.0



2. Schedule a follow up  within 3 days of your ER visit.



3. Take your prescriptions to the pharmacy and get them filled today.



4. Take the medications as prescribed and until completed.



5. You have been referred for further care



6. You may need additional tests Your doctors will help you figure out what you 
need and how to get them done.



7. Please read all paperwork provided to you.



Additional instructions

See Attached





AttachmentsThe following attachments cannot be sent through Care 
Everywhere.Urinary Tract Infections in Women (English)documented in this 
encounter



Plan of Treatment







 Date  Type  Specialty  Care Team  Description

 

 2019  Technician Visit  Maternal Fetal  Kayla, Tessie Tsang MD



[address] [facility] [id number]



[place], [place] [address] [phone] (Fax)  



     Medicine  



5, [facility] Room  

 

 2019  Routine Prenatal Visit  OB Satellites  Ami Alcala,  



       FNP



  



       [address]



  



       [place] [address]



  



       [phone] (Fax)  









 Name  Type  Priority  Associated Diagnoses  Date/Time

 

 TOTAL BETA HCG ASSAY  LAB  STAT  Abdominal pain affecting  2019  8:32 PM 
CDT



       pregnancy  

 

 Type and Screen - ONCE  LAB  STAT  Abdominal pain affecting  2019  8:32 
PM CDT



 STAT      pregnancy  









 Name  Type  Priority  Associated Diagnoses  Order Schedule

 

 TOTAL BETA HCG ASSAY  LAB  STAT  Abdominal pain affecting  STAT for 1 
Occurrences



       pregnancy  starting 2019 until



         2019, 1 completed









 Health Maintenance  Due Date  Last Done  Comments

 

 INFLUENZA VACCINE (#1)  2019    

 

 DTaP,Tdap,and Td Vaccines  2019  Postponed from



 (1 - Tdap)      2010 (Pregnant or



       Breastfeeding)

 

 PAP SMEAR  08/15/2022  08/15/2019, 2017,  



     2014, Additional  



     history exists  

 

 PNEUMOCOCCAL 0-64 YEARS  Aged Out    No longer eligible



 COMBINED SERIES      based on patient's age



       to complete this topic



documented as of this encounter



Procedures







 Procedure Name  Priority  Date/Time  Associated  Comments



       Diagnosis  

 

 CBC WITH DIFFERENTIAL  STAT  2019  8:32  Abdominal pain  Results for this



     PM CDT  affecting pregnancy  procedure are in



         the results



         section.

 

 TYPE AND SCREEN  STAT  2019  8:32  Abdominal pain  



     PM CDT  affecting pregnancy  

 

 URINALYSIS  STAT  2019  8:32  Abdominal pain  Results for this



     PM CDT  affecting pregnancy  procedure are in



         the results



         section.

 

 CBC WITH DIFF  Routine  2019  8:32  Abdominal pain  Results for this



     PM CDT  affecting pregnancy  procedure are in



         the results



         section.

 

 COMP. METABOLIC PANEL  STAT  2019  8:32  Abdominal pain  Results for this



 ([address])    PM CDT  affecting pregnancy  procedure are in



         the results



         section.

 

 US TRANSVAGINAL  ASAP  2019  8:27  Abdominal pain  Results for this



     PM CDT  affecting pregnancy  procedure are in



         the results



         section.

 

 NOTICE OF PRIVACY  Routine  2019  7:18    



 PRACTICES    PM CDT    

 

 CONSENT/REFUSAL FOR  Routine  2019  7:18    



 DIAGNOSIS AND    PM CDT    



 TREATMENT        



documented in this encounter



Results

CBC WITH DIFFERENTIAL (2019  8:32 PM CDT)





 Component  Value  Ref Range  Performed At  Pathologist Signature

 

 WBC  9.06  4.30 - 11.10  [facility]  



     10*3/L  [facility]OSPITAL [facility]  

 

 RBC  3.96  3.93 - 5.25  [facility]  



     10*6/L  [facility] LABORATORY  

 

 HGB  12.6  11.6 - 15.0 g/dL  [facility] LAB[facility]  

 

 HCT  36.2  35.7 - 45.2 %  [facility]  

 

 MCV  91.4  80.6 - 95.5 fL  [facility]  

 

 MCH  31.8  25.9 - 32.8 pg  [facility]  

 

 MCHC  34.8  31.6 - 35.1 g/dL  [facility]  

 

 RDW-SD  41.2  39.0 - 49.9 fL  [facility]  

 

 RDW-CV  12.3  12.0 - 15.5 %  [facility] LAB[facility]  

 

 PLT  220  166 - 358  [facility]  



     10*3/L  HOSPITAL LAB[facility]  

 

 MPV  11.5  9.5 - 12.9 fL  [facility]  

 

 NRBC/100 WBC  0.0  0.0 - 10.0 /100  [facility]  



     WBCs  HOSPITAL LAB[facility]ATORY  

 

 NRBC x10^3  <0.01  10*3/L  [facility]  

 

 GRAN MAT (NEUT) %  67.6  %  [facility]  

 

 IMM GRAN %  0.30  %  [facility]  

 

 LYMPH %  22.6  %  [facility]  

 

 MONO %  7.5  %  [facility]  

 

 EOS %  1.4  %  [facility]  

 

 BASO %  0.6  %  [facility]  

 

 GRAN MAT x10^3(ANC)  6.12  1.88 - 7.09  [facility]  



     10*3/uL  HOSPITAL [facility]  

 

 IMM GRAN x10^3  0.03  0.00 - 0.06  [facility]  



     10*3/uL  HOSPITAL [facility]  

 

 LYMPH x10^3  2.05  1.32 - 3.29  [facility]  



     10*3/uL  HOSPITAL [facility]  

 

 MONO x10^3  0.68  0.33 - 0.92  [facility]  



     10*3/uL  [facility]OSPITAL [facility]  

 

 EOS x10^3  0.13  0.03 - 0.39  [facility]  



     10*3/uL  HOS[facility]ITAL [facility]  

 

 BASO x10^3  0.05  0.01 - 0.07  [facility]  



     10*3/uL  HOSPITAL LAB[facility]  









 Specimen

 

 Blood - ARM, RIGHT









 Performing Organization  Address  City/State/Zipcode  Phone Number

 

 [facility]  CLIA: [id number], [address]  [place] [address]  



 LABORATORY  [address]    



URINALYSIS (2019  8:32 PM CDT)





 Component  Value  Ref Range  Performed At  Pathologist Signature

 

 APPEARANCE  Clear  Clear  [facility]  



       LABORATORY  

 

 COLOR  Yellow  Yellow  [facility]  



       LABORATORY  

 

 PH  7.0  4.8 - 8.0  [facility]  



       LABORATORY  

 

 SP GRAVITY  1.010  1.003 - 1.030  [facility]  



       LABORATORY  

 

 GLU U QUAL  Negative  Negative  [facility]  



       LABORATORY  

 

 BLOOD  Negative  Negative  [facility]  



       LABORATORY  

 

 KETONES  Negative  Negative  [facility]  



       LABORATORY  

 

 PROTEIN  Negative  Negative  [facility]  



       LABORATORY  

 

 UROBILIN  0.2 mg/dL  0-1.0 mg/dL  [facility]  



       LABORATORY  

 

 BILIRUBIN  Negative  Negative  [facility]  



       LABORATORY  

 

 NITRITE  Negative  Negative  [facility]  



       LABORATORY  

 

 LEUK BRITTNEY  Trace (A)  Negative  [facility]  



       LABORATORY  

 

 RBC/HPF  0  0 - 3 HPF  [facility]  



       LABORATORY  

 

 WBC/HPF  2  0 - 5 HPF  [facility]  



       LABORATORY  

 

 BACTERIA  Moderate (A)  Negative  [facility]  



       LABORATORY  

 

 AMORPHOUS  3+  HPF  [facility]  



       LABORATORY  

 

 SQ EPITH  8  HPF  [facility]  



       LABORATORY  









 Specimen

 

 Urine - URINE, CLEAN CATCH









 Performing Organization  Address  City/State/Zipcode  Phone Number

 

 [facility]  CLIA: [id number], [address]  [place] [address]  



 LABORATORY  [address]    



COMP. METABOLIC PANEL ([address]2799) (2019  8:32 PM CDT)





 Component  Value  Ref Range  Performed At  Pathologist Signature

 

 NA  140  135 - 145 mmol/L  [facility] LABORATORY  

 

 K  3.6  3.5 - 5.0 mmol/L  [facility] LAB[facility]  

 

 CL  104  98 - 108 mmol/L  [facility] LAB[facility]AT[facility]  

 

 CO2 TOTAL  24  23 - 31 mmol/L  [facility] LAB[facility]  

 

 AGAP  12  2 - 16  [facility] LAB[facility]ATORY  

 

 BUN  8  7 - 23 mg/dL  [facility] LAB[facility]  

 

 GLUCOSE  78  70 - 110 mg/dL  [facility] LAB[facility]AT[facility]  

 

 CREATININE  0.61  0.50 - 1.04  [facility]  



     mg/dL  [facility] LABORATORY  

 

 TOTAL BILI  0.1  0.1 - 1.1 mg/dL  [facility] LAB[facility]  

 

 CALCIUM  9.4  8.6 - 10.6 mg/dL  [facility] LAB[facility]  

 

 T PROTEIN  7.5  6.3 - 8.2 g/dL  [facility] LAB[facility]AT[facility]  

 

 ALBUMIN  4.5  3.5 - 5.0 g/dL  [facility] LAB[facility]  

 

 ALK PHOS  51  34 - 122 U/L  [facility] LAB[facility]ATORY  

 

 ALT(SGPT)  6 (L)  9 - 51 U/L  [facility] LAB[facility]  

 

 AST(SGOT)  18  13 - 40 U/L  [facility] LAB[facility]  

 

 eGFR Calculation  115.2  mL/min/1.73m2  [facility]  



 (Non-)      [facility] LABORATORY  

 

 eGFR Calculation  139.6  mL/min/1.73m2  [facility]  



 ()      [facility] LABORATORY  









 Specimen

 

 Blood - ARM, RIGHT









 Narrative  Performed At

 

 Association of Glomerular Filtration Rate (GFR)  [facility] 
LABORATORY



 and Staging of Kidney Disease*



  



 +-----------------------+---------------------+-  



 ------------------------+



  



 | GFR (mL/min/1.73 m2)| With Kidney  



 Damage|Without Kidney Damage



  



 +-----------------------+---------------------+-  



 ------------------------+



  



 |>90|  



 Stage one|  



 Normal



  



 +-----------------------+---------------------+-  



 ------------------------+



  



 |60-89|S  



 tage two| Decreased  



 GFR 



  



 +-----------------------+---------------------+-  



 ------------------------+



  



 |30-59|S  



 tage three| Stage  



 three 



  



 +-----------------------+---------------------+-  



 ------------------------+



  



 |15-29|S  



 tage four | Stage  



 four



  



 +-----------------------+---------------------+-  



 ------------------------+



  



 |<15 (or dialysis)|Stage  



 five | Stage  



 five



  



 +-----------------------+---------------------+-  



 ------------------------+



  



 *Each stage assumes the associated GFR level has  



 been in effect for at least three  



 months.Stages 1 to 5, with or without kidney  



 disease, indicate chronic kidney disease.



  



 Notes: Determination of stages one and two (with  



 eGFR >59mL/min/1.73 m2) requires estimation of  



 kidney damage for at least three months as  



 defined by structural or functional  



 abnormalities of the kidney, manifested by  



 either:



  



 Pathological abnormalities or Markers of kidney  



 damage (including abnormalities in the  



 composition of the blood or urine or  



 abnormalities in imaging tests).  









 Performing Organization  Address  City/State/Zipcode  Phone Number

 

 [facility]  CLIA: [id number], [address]  [place] [address]  



 [facility]  [address]    



US TRANSVAGINAL (2019  8:27 PM CDT)





 Specimen

 

 









 Impressions  Performed At

 

  



  PACS/VR/DOSE



 Live intrauterine pregnancy corresponding to a gestational age of 8  



 weeks



  



 and 4 days.



  



  



  



  



  



  



  



 IAbbie MD., have reviewed this study and agree with  



 the



  



 above report.  









 Narrative  Performed At

 

 EXAM: US TRANSVAGINAL



  PACS/VR/DOSE



  



  



 HISTORY:29 years-old Female presenting with suspected ectopic  



 pregnancy 



  



  



  



 Beta-hcg: Pending



  



 LMP: 2019



  



 Reproductive history:  A0



  



  



  



 COMPARISON:None.



  



  



  



 TECHNIQUE/FINDINGS: 



  



  



  



 Transvaginal pelvic ultrasound was performed. 



  



  



  



 The uterus is normal in size at 9.3 x 6.4 x 7.9 cm with normal  



 echotexture



  



 and contains a normal gestational sac. 



  



  



  



 A live intrauterine pregnancy was demonstrated with a crown rump length  



 of



  



 1.9 cm corresponding to a gestational age of 8 weeks and 4 days. 



  



  



  



 Doppler studies demonstrated evidence of fetal heart tones with measured



  



 fetal heart rate of 170 BPM. The yolk sac is present measuring 0.3 cm. 



  



  



  



 Trace free fluid is present within the pelvis. 



  



  



  



 The bilateral ovaries are normal with the right ovary measuring 2.9 x  



 2.5 x



  



 2.1 cm (8 mL) and the left ovary measuring 2.5 x 1.9 x 1.3 cm (3  



 mL).



  



  



  



   









 Procedure Note

 

 [facility], Radiant Results Inft User - 2019  9:43 PM CDT



EXAM: US TRANSVAGINAL



 



 HISTORY:  29 years-old Female presenting with suspected ectopic pregnancy



 



 Beta-hcg: Pending



 LMP: 2019



 Reproductive history:  A0



 



 COMPARISON:  None.



 



 TECHNIQUE/FINDINGS:



 



 Transvaginal pelvic ultrasound was performed.



 



 The uterus is normal in size at 9.3 x 6.4 x 7.9 cm with normal echotexture



 and contains a normal gestational sac.



 



 A live intrauterine pregnancy was demonstrated with a crown rump length of



 1.9 cm corresponding to a gestational age of 8 weeks and 4 days.



 



 Doppler studies demonstrated evidence of fetal heart tones with measured



 fetal heart rate of 170 BPM. The yolk sac is present measuring 0.3 cm.



 



 Trace free fluid is present within the pelvis.



 



 The bilateral ovaries are normal with the right ovary measuring 2.9 x 2.5 x



 2.1 cm (8 mL) and the left ovary measuring 2.5 x 1.9 x 1.3 cm (3 mL).



 



 



 IMPRESSION



 



 Live intrauterine pregnancy corresponding to a gestational age of 8 weeks



 and 4 days.



 



 



 



 IAbbie MD., have reviewed this study and agree with the



 above report.









 Performing Organization  Address  City/State/[facility]  Phone Number

 

 PACS/VR/DOSE      



documented in this encounter



Visit Diagnoses







 Diagnosis

 

 Abdominal pain affecting pregnancy - Primary

 

 Urinary tract infection without hematuria, site unspecified



documented in this encounter



Administered Medications







 Medication Order  MAR Action  Action Date  Dose  Rate  Site

 

 NaCl 0.9% (NS) bolus  New Bag  2019  8:40 PM CDT  1,000 mL  999 mL/hr  



 infusion 1,000 mL



          



 at 999 mL/hr, 1,000 mL, IV          



 Infusion, ONCE, 1 dose, 19 at 2100, STAT          









   



documented in this encounter



Insurance







 Payer  Benefit Plan /  Subscriber ID  Effective Dates  Phone  Address  Type



   Group          

 

 [employer] [place]  xxxxxxxxx  2019-Present  [phone]  [address]  Medicaid



   [place]        [id number] [place] [address]  



           [id number]  









 Guarantor Name  Account Type  Relation to  Date of  Phone  Billing



     Patient  Birth    Address

 

 Mellissa Tobar  Personal/Family  Self  1990  [phone]  [address] [place] [address] Dr. Romo        (Home)  [place] [address]



documented as of this encounter



Advance Directives







 Name  Relationship  Healthcare Agent Relationship  Communication

 

 Gerhard Harrell  Primary healthcare agent  [phone]



       (Mobile)[phone] (Home)

## 2020-03-25 NOTE — XMS REPORT
Summary of Care

 Created on:2020



Patient:Mellissa Tobar

Sex:Female

:1990

External Reference #:[id number]





Demographics







 Address  [address]



   [place] [address]

 

 Mobile Phone  [phone]

 

 Home Phone  [phone]

 

 Phone  [phone]

 

 Email Address  [email or web address]

 

 Email Address  [email or web address]

 

 Preferred Language  English

 

 Marital Status  

 

 [employer] Affiliation  Unknown

 

 Race  White

 

 Ethnic Group  Not  or 









Author







 Organization  [facility]

 

 Address  [address] [place] [address]









Support







 Name  Relationship  Address  Phone

 

 Gerhard Tobar  Unavailable  [address]   [phone]



     [place] [address]  

 

 Cruz Diane  Unavailable  [id number]  [phone]



     [place] [address]  









Care Team Providers







 Name  Role  Phone

 

 Doctor Unassigned, No Name  Medicaid [facility]  Unavailable

 

 Faculty, Ashish Rmchp Mfm  Unavailable  Unavailable

 

 Jayy Price FNP  Unavailable  [phone]

 

 Ami Alcala FNP  Primary Care Provider  [phone]









Encounter Details







 Date  Type  Department  Care Team  Description

 

 2020  Orders Only  [facility]



  Doctor Unassigned, No  



     [address] [facility]



  Name



  



     [place] [address]  [address] [place] [address]  







Allergies

No Known Allergiesdocumented as of this encounter (statuses as of 2020)



Medications







 Medication  Sig  Dispensed  Refills  Start Date  End Date  Status

 

 PNV 67-iron ps-folate  Take 1 capsule by  30 capsule  11  2019    Active



 no.1-dha (VITAFOL  mouth daily.          



 ULTRA) 29 mg iron- 1            



 mg-200 mg            



 CapIndications: High            



 risk pregnancy,            



 antepartum            

 

 proMETHazine 25 mg  Take 1 tablet by  30 tablet  3  2019    Active



 tabletIndications:  mouth every 4          



 Nausea/vomiting in  (four) hours as          



 pregnancy  needed for Nausea          



   and Vomiting          



   (N/V).          



documented as of this encounter (statuses as of 2020)



Active Problems







 Problem  Noted Date

 

 Rh negative, antepartum  10/23/2019

 

 History of fetal anomaly in prior pregnancy, currently pregnant  08/15/2019

 

 High risk pregnancy, antepartum  2017

 

 Current pregnancy with history of pre-term labor in third trimester  2017

 

 H/O cervical incompetence  2017

 

 Recurrent pregnancy loss, antepartum condition or complication  2017

 

 History of PID  2017

 

 Fetal demise, less than 22 weeks  2014









 Pregnant  Estimated Date of Delivery  Comments

 

 Yes  2020  Based on last menstrual period of 2019



     (Approximate)



documented as of this encounter (statuses as of 2020)



Resolved Problems







 Problem  Noted Date  Resolved Date

 

 H/O  delivery, currently pregnant, second trimester  10/10/2019  12/30/
2019

 

 Supervision of pregnancy with other poor reproductive or  2017  08/15/
2019



 obstetric history, third trimester    

 

 Preg care for patient w recurrent preg loss, third trimester  2017  08/15
/2019

 

 Suprvsn of preg w history of pre-term labor, third trimester  2017  08/15
/2019

 

 Rh negative state in antepartum period  2017  08/15/2019

 

 Rh negative state in antepartum period, first trimester  2017

 

 Well woman exam with routine gynecological exam  2017

 

 Screening for STD (sexually transmitted disease)  2017

 

 History of anemia  2017  08/15/2019

 

 Other general counseling and advice for contraceptive  2017



 management    

 

 Threatened  in early pregnancy  2015

 

 Unsure of last menstrual period as reason for ultrasound  2015



 scan    

 

 History of cervical incompetence in pregnancy, currently  2015



 pregnant, first trimester    

 

 Rh negative state in antepartum period, first trimester,  2015



 fetus 1    

 

 Pregnancy with other poor reproductive history  07/10/2014  2017

 

 High-risk pregnancy  07/10/2014  2017

 

 History of  delivery, currently pregnant  2014









 Overview: 







 Delivered at 21 weeks









 Rh negative status during pregnancy, antepartum  2014



documented as of this encounter (statuses as of 2020)



Immunizations







 Name  Administration Dates  Next Due

 

 Rho (d) Immune Globulin  2020, 2019, 08/10/2017  

 

 TDAP (ADACEL) VACCINE  2020  

 

 Td  2010  



documented as of this encounter



Social History







 Tobacco Use  Types  Packs/Day  Years Used  Date

 

 Former Smoker  Cigarettes  0.1  1  Quit: 2014









 Smokeless Tobacco: Never Used      









 Alcohol Use  Drinks/Week  oz/Week  Comments

 

 No  0 Standard drinks or equivalent  0.0  









 Pregnant  Estimated Date of Delivery  Comments

 

 Yes  2020  Based on last menstrual period of 2019



     (Approximate)









 Sex Assigned at Birth  Date Recorded

 

 Not on file  









 Job Start Date  Occupation  Industry

 

 Not on file  Not on file  Not on file









 Travel History  Travel Start  Travel End









 No recent travel history available.



documented as of this encounter



Last Filed Vital Signs

Not on filedocumented in this encounter



Plan of Treatment







 Health Maintenance  Due Date  Last Done  Comments

 

 INFLUENZA VACCINE (#1)  2020    Postponed from



       2019 (Refused)

 

 PAP SMEAR  08/15/2022  08/15/2019, 2017,  



     2014, Additional  



     history exists  

 

 DTaP,Tdap,and Td Vaccines  2030, 2010  



 (2 - Td)      

 

 PNEUMOCOCCAL 0-64 YEARS  Aged Out    No longer eligible



 COMBINED SERIES      based on patient's age



       to complete this topic



documented as of this encounter



Procedures







 Procedure Name  Priority  Date/Time  Associated Diagnosis  Comments

 

 CONSENT/REFUSAL FOR  Routine  2020 10:53 PM CDT    



 DIAGNOSIS AND TREATMENT        



documented in this encounter



Results

Not on filedocumented in this encounter



Insurance







 Payer  Benefit Plan /  Subscriber ID  Effective  Phone  Address  Type



   Group    [facility]  COMMUNITY  xxxxxxxxx  10/1/2019-Pres    [address]  Medicaid



 HEALTH CHOICE -  HEALTH CHOICE    ent    [id number]



  



 MANAGED  MEDICAID        [place]          [id number]  



documented as of this encounter



Advance Directives







 Name  Relationship  Healthcare Agent Relationship  Communication

 

 Gerhard Amadou Harrell  Primary healthcare agent  [phone]



       (Mobile)[phone] (Home)

## 2020-03-25 NOTE — XMS REPORT
Patient Summary Document

 Created on:2020



Patient:RENE HAWTHORNE

Sex:Female

:1990

External Reference #:[id number]





Demographics







 Address  [address] [place] [address]

 

 Home Phone  [phone]

 

 Email Address  [email or web address]

 

 Preferred Language  Unknown

 

 Marital Status  Unknown

 

 [employer] Affiliation  Unknown

 

 Race  Unknown

 

 Additional Race(s)  Unavailable

 

 Ethnic Group  Unknown









Author







 Organization  [facility]

 

 Address  [address] [place] [address] Dr. Vila. [address] [place] [address]

 

 Phone  [phone]









Care Team Providers







 Name  Role  Phone

 

 Unavailable  Unavailable  Unavailable









Problems

This patient has no known problems.



Allergies, Adverse Reactions, Alerts

This patient has no known allergies or adverse reactions.



Medications

This patient has no known medications.

## 2020-03-25 NOTE — PREOPHP
Date of Admission:  2020



History:  Ms. Tobar is a 30-year-old  female,  6, para 0-2-3-1, 
with prior birth at 21 weeks who  shortly after birth, fetal demise at 17 
weeks, and delivery at 31 weeks.  She has been followed through the [facility] 
and being given IM progesterone on various visits, but I believe that she was 
probably not very compliant.  She disappeared from them from between 32 and 38 
weeks.  She noticed the possible leaking of fluid starting about 2 o'clock 
yesterday afternoon, intermittently was leaking and finally presented to 
[facility] where rupture of membranes was confirmed.  She signed 
out apparently AMA, told them that she was going to be going to [place].  She 
dropped her daughter off, but then because contractions became stronger, 
presented to Labor and Delivery at [facility].  Rupture of 
membranes was confirmed.  She was noted to be 1 cm on admission with irregular 
contractions approximately every 5 to 6 minutes.



Past Medical History:  Please see prenatal record.



Family History:  Please see prenatal record.



Review of Systems:

She reports no recent cough, cold, fever, or chills.  No recent nausea or 
vomiting.  No breast lumps or knots.  No bowel or bladder issues.  Infant has 
been active.



Physical Examination:

General:  Reveals  female in moderate discomfort. 

Neck:  Supple without adenopathy or thyromegaly. 

Lungs:  Clear. 

Cardiac:  Regular rate and rhythm without murmurs. 

Breasts:  Not examined. 

Abdomen:  Estimated fetal weight of 6+ pounds. 

Pelvic:  Now cervix noted to be 4 cm dilated, almost completely effaced, vertex 
presentation at -1 station. 

Extremities:  No cyanosis, clubbing, or edema.



Impression:  38-week pregnancy, spontaneous rupture of membranes, now active 
labor after Pitocin augmentation.



Plan:  We will plan epidural placement.  We will hydrate, call Anesthesia.  
Discrepancy regarding whether or not she has gotten flu shot and/or Tdap shots 
and patient has unconvincing reason why she did not come in for 6 weeks for her 
prenatal care despite obviously being a high-risk pregnancy.  We will do urine 
drug screening. Strep culture done yesterday, results not back yet, will treat 
according risk factors and at this time she has none.





TANK/ROMULO

DD:  2020 06:33:28   Voice ID:  [id number]

BILLY

## 2020-03-25 NOTE — XMS REPORT
Summary of Care

 Created on:2020



Patient:Mellissa Tobar

Sex:Female

:1990

External Reference #:[id number]





Demographics







 Address  [address]



   [place] [address]

 

 Mobile Phone  [phone]

 

 Home Phone  [phone]

 

 Phone  [phone]

 

 Email Address  [email or web address]

 

 Email Address  [email or web address]

 

 Preferred Language  English

 

 Marital Status  

 

 Yazidi Affiliation  Unknown

 

 Race  White

 

 Ethnic Group  Not  or 









Author







 Organization  [facility]

 

 Address  [address] [place] [id number]









Support







 Name  Relationship  Address  Phone

 

 Gerhard Tobar  Unavailable  [address]  [phone]



     [place] [address]  

 

 Cruz Contreras  Unavailable  [address]  [phone]



     [place] [address]  









Care Team Providers







 Name  Role  Phone

 

 Doctor Unassigned, No Name  Medicaid [facility]  Unavailable

 

 Faculty, Ang Rmdede [facility]  Unavailable  Unavailable

 

 Jayy Price FNP  Unavailable  [phone]

 

 Ami Alcala  Primary Care Provider  [phone]









Reason for Visit







 Reason  Comments

 

 Prenatal Care  







Encounter Details







 Date  Type  Department  Care Team  Description

 

 2020  Routine Prenatal  [facility]-  Ami Alcala  High-risk 
pregnancy in third trimester (Primary Dx);



   Visit  MAGDALENA Watts



  Current pregnancy with history of pre-term labor in third trimester;



     [address]



  [address]



  Rh negative, antepartum



     [place], [place]  [address] [place]



  



     [address]



  [place]  



     [phone] [address] [phone]  



       (Fax)  







Allergies

No Known Allergiesdocumented as of this encounter (statuses as of 2020)



Medications







 Medication  Sig  Dispensed  Refills  Start Date  End Date  Status

 

 PNV 67-iron  Take 1 capsule  30 capsule  11  2019    Active



 ps-folate no.1-dha  by mouth          



 (VITAFOL ULTRA) 29  daily.          



 mg iron- 1 mg-200            



 mg CapIndications:            



 High risk            



 pregnancy,            



 antepartum            

 

 proMETHazine 25 mg  Take 1 tablet  30 tablet  3  2019    Active



 tabletIndications:  by mouth every          



 Nausea/vomiting in  4 (four) hours          



 pregnancy  as needed for          



   Nausea and          



   Vomiting          



   (N/V).          

 

 HYDROXYprogest,PF,      0  2019  Discontinued



 ,preg presv, 250            



 mg/mL (1 mL)            



 injection            



documented as of this encounter (statuses as of 2020)



Active Problems







 Problem  Noted Date

 

 Rh negative, antepartum  10/23/2019

 

 History of fetal anomaly in prior pregnancy, currently pregnant  08/15/2019

 

 High risk pregnancy, antepartum  2017

 

 Current pregnancy with history of pre-term labor in third trimester  2017

 

 H/O cervical incompetence  2017

 

 Recurrent pregnancy loss, antepartum condition or complication  2017

 

 History of PID  2017

 

 Fetal demise, less than 22 weeks  2014









 Pregnant  Estimated Date of Delivery  Comments

 

 Yes  2020  Based on last menstrual period of 2019



     (Approximate)



documented as of this encounter (statuses as of 2020)



Resolved Problems







 Problem  Noted Date  Resolved Date

 

 H/O  delivery, currently pregnant, second trimester  10/10/2019  12/30/
2019

 

 Supervision of pregnancy with other poor reproductive or  2017  08/15/
2019



 obstetric history, third trimester    

 

 Preg care for patient w recurrent preg loss, third trimester  2017  08/15
/2019

 

 Suprvsn of preg w history of pre-term labor, third trimester  2017  08/15
/2019

 

 Rh negative state in antepartum period  2017  08/15/2019

 

 Rh negative state in antepartum period, first trimester  2017

 

 Well woman exam with routine gynecological exam  2017

 

 Screening for STD (sexually transmitted disease)  2017

 

 History of anemia  2017  08/15/2019

 

 Other general counseling and advice for contraceptive  2017



 management    

 

 Threatened  in early pregnancy  2015

 

 Unsure of last menstrual period as reason for ultrasound  2015



 scan    

 

 History of cervical incompetence in pregnancy, currently  2015



 pregnant, first trimester    

 

 Rh negative state in antepartum period, first trimester,  2015



 fetus 1    

 

 Pregnancy with other poor reproductive history  07/10/2014  2017

 

 High-risk pregnancy  07/10/2014  2017

 

 History of  delivery, currently pregnant  2014









 Overview: 







 Delivered at 21 weeks









 Rh negative status during pregnancy, antepartum  2014



documented as of this encounter (statuses as of 2020)



Immunizations







 Name  Administration Dates  Next Due

 

 Rho (d) Immune Globulin  2020, 2019, 08/10/2017  

 

 TDAP (ADACEL) VACCINE  2020  

 

 Td  2010  



documented as of this encounter



Social History







 Tobacco Use  Types  Packs/Day  Years Used  Date

 

 Former Smoker  Cigarettes  0.1  1  Quit: 2014









 Smokeless Tobacco: Never Used      









 Alcohol Use  Drinks/Week  oz/Week  Comments

 

 No  0 Standard drinks or equivalent  0.0  









 Pregnant  Estimated Date of Delivery  Comments

 

 Yes  2020  Based on last menstrual period of 2019



     (Approximate)









 Sex Assigned at Birth  Date Recorded

 

 Not on file  









 Job Start Date  Occupation  Industry

 

 Not on file  Not on file  Not on file









 Travel History  Travel Start  Travel End









 No recent travel history available.



documented as of this encounter



Last Filed Vital Signs







 Vital Sign  Reading  Time Taken  Comments

 

 Blood Pressure  111/65  2020  9:00 AM CDT  

 

 Pulse  71  2020  9:00 AM CDT  

 

 Temperature  36.6 C (97.8 F)  2020  9:00 AM CDT  

 

 Respiratory Rate  16  2020  9:00 AM CDT  

 

 Oxygen Saturation  -  -  

 

 Inhaled Oxygen Concentration  -  -  

 

 Weight  77.3 kg (170 lb 8 oz)  2020  9:00 AM CDT  

 

 Height  165.1 cm (5' 5")  2020  9:00 AM CDT  

 

 Body Mass Index  28.37  2020  9:00 AM CDT  



documented in this encounter



Progress Notes

Ami Alcala, MAGDALENA - 2020  8:30 AM CDTFormatting of this note might 
be different from the original.

Chief complaint:

Chief Complaint

Patient presents with

 Prenatal Care



HPI

Mellissa Tobar is a 30 year old female is a 

@ 38w1d here for prenatal visit. Patient's last menstrual period was 2019 
(approximate). Estimated Date of Delivery: 20



Today she denies any complaints or concerns.



She is taking PNV. She reports good FM. She denies any ctx/cramping, VB, LOF, HA
, visual disturbance, vaginal discharge or dysuria. She denies any foreign 
travel. She also denies any physical, sexual or emotional abuse.



Histories

OB History

 Para Term  AB Living

6 3 0 2 2 1

SAB TAB Ectopic Multiple Live Births

1 1 0 0 2



# Outcome Date GA Lbr Damon/2nd Weight Sex Delivery Anes PTL Lv

6 Current

5  17 31w6d  3 lb 11 oz (1.673 kg) F NORMAL SPONT   DEANGELO

4 SAB 07/01/15 11w0d

3 Para 14 17w0d    Vag-Spont   FD

2 TAB 08 11w0d

1  05 21w0d  1 lb 2 oz (0.51 kg) M NORMAL SPONT  Y ND

   Birth Comments: Incompentent cervix



Obstetric Comments

 (different partner)



,,, current pregnacy (current partner)



Past Medical History:

Diagnosis Date

 Anemia 

 resolved, related to pregnancy

 Anxiety

 self reported-no tx

 History of anemia 2017

 Menstrual disorder 2013

 irregular, heavy, and painful

 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 
2014

 Screening for STD (sexually transmitted disease) 2017



Family History

Problem Relation Age of Onset

 Other - see comments Mother

     cervical CA

 Ovarian Cancer Maternal Grandmother

 Breast Cancer Maternal Grandmother

 Arthritis Father

 Hypertension Father

 No Significant Medical Problems Sister

 Breast Cancer Paternal Grandmother

 Asthma NoFHx

 Birth defects NoFHx

 Colon Cancer NoFHx

 Cancer NoFHx

 Depression NoFHx

 Diabetes NoFHx

 Genetic NoFHx

 Heart NoFHx

 High cholesterol NoFHx

 Mental retardation NoFHx

 Neurological NoFHx

 Osteoporosis NoFHx

 Psychiatry NoFHx



Family Status

Relation Name Status

 Mo  Alive

 MGMo  Alive

 Fa  Alive

 Sis  Alive

 MAunt  Alive

 MUnc  Alive

 PAunt  Alive

 PUnc  Alive

 MGFa  Alive

 PGMo  Alive

 PGFa  Alive

 NoFHx  (Not Specified)



Past Surgical History:

Procedure Laterality Date

 DILATION AND CURETTAGE (SHX)  2008



Social History



Socioeconomic History

 Marital status: 

  Spouse name: Not on file

 Number of children: 0

 Years of education: 15

 Highest education level: Not on file

Occupational History

 Occupation: Unemployed

Social Needs

 Financial resource strain: Not on file

 Food insecurity:

  Worry: Not on file

  Inability: Not on file

 Transportation needs:

  Medical: Not on file

  Non-medical: Not on file

Tobacco Use

 Smoking status: Former Smoker

  Packs/day: 0.10

  Years: 1.00

  Pack years: 0.10

  Types: Cigarettes

  Last attempt to quit: 2014

  Years since quittin.8

 Smokeless tobacco: Never Used

Substance and Sexual Activity

 Alcohol use: No

  Alcohol/week: 0.0 standard drinks

 Drug use: No

 Sexual activity: Yes

  Partners: Male

  Birth control/protection: None

  Comment: last sexual intercourse 2019

Lifestyle

 Physical activity:

  Days per week: Not on file

  Minutes per session: Not on file

 Stress: Not on file

Relationships

 Social connections:

  Talks on phone: Not on file

  Gets together: Not on file

  Attends Caodaism service: Not on file

  Active member of club or organization: Not on file

  Attends meetings of clubs or organizations: Not on file

  Relationship status: Not on file

 Intimate partner violence:

  Fear of current or ex partner: Not on file

  Emotionally abused: Not on file

  Physically abused: Not on file

  Forced sexual activity: Not on file

Other Topics Concern

 Not on file

Social History Narrative

 Denies domestic violence or abuse

 No exposure to cats

 No Caodaism preference

 Patient lives with father and child.



Social History



Substance and Sexual Activity

Sexual Activity Yes

 Partners: Male

 Birth control/protection: None

 Comment: last sexual intercourse 2019







Labs

I have reviewed the patient's labs. and Labs are pending.



Radiology

No new radiology.



Allergies

Mellissa has No Known Allergies.



Medications

Mellissa has a current medication list which includes the following 
prescription(s): pnv 67-iron ps-folate no.1-dha and promethazine.



Review of Systems

Constitutional: Negative for appetite change, fatigue and fever.

Eyes: Negative for visual disturbance.

Respiratory: Negative.

Cardiovascular: Negative for palpitations and leg swelling.

Gastrointestinal: Negative for abdominal pain, constipation, diarrhea, nausea 
and vomiting.

Genitourinary: Negative.  Negative for dysuria, vaginal bleeding, vaginal 
discharge and pelvic pain.

Musculoskeletal: Negative.

Skin: Negative for rash.

Neurological: Negative for dizziness, light-headedness and headaches.

Psychiatric/Behavioral: Negative.



/65 (BP Location: Right arm, Patient Position: Sitting, BP CUFF SIZE: 
Adult Medium)  | Pulse 71  | Temp 36.6 C (97.8 F) (Oral)  | Resp 16  | Ht 5
' 5" (1.651 m)  | Wt 170 lb 8 oz (77.3 kg)  | LMP 2019 (Approximate)  | 
BMI 28.37 kg/m

Pregravid BMI: 24.6



Physical Exam

Vitals reviewed.

Constitutional: She is oriented to person, place, and time. She appears well-
developed and well-nourished.

See prenatal flowsheet

Cardiovascular: No peripheral edema present.

Pulmonary/Chest: Normal inspiratory effort.

Abdominal: Abdomen is soft.

Neuro/Psychiatric: She has a normal mood and affect. She is oriented to person, 
place, and time.

Skin: Skin normal.





Assessment/Plan



1. High-risk pregnancy in third trimester

38w1d

Labor precautions, fetal kick counts and PIH warnings reviewed.

Needs GBS and CBC today

Risks and benefits of 39 week IOL discussed. Pt agrees to 39 week IOL, 
appointment requested.



- CBC WITH DIFF

- GROUP B STREPTOCOCCUS BY PCR

- CBC WITH DIFFERENTIAL

- POCT URINALYSIS W/O SPECIFIC GRAVITY



2. Current pregnancy with history of pre-term labor in third trimester

S/p weekly 17P injections, now term



3. Rh negative, antepartum

Received rhogam 20



Return to clinic in 1 weeks.

Reviewed patient instructions and provided printed copy.

Pregnancy at  38w1d



This visit did not involve counseling and coordination that comprised more than 
50% of the visit time.

Electronically signed by Ami Alcala FNP at 2020  9:21 AM 
CDTdocumented in this encounter



Plan of Treatment







 Name  Type  Priority  Associated Diagnoses  Date/Time

 

 CBC WITH DIFF  LAB  Routine  High-risk pregnancy in  2020  9:00 AM



       third trimester  CDT

 

 GROUP B STREPTOCOCCUS BY  LAB  Routine  High-risk pregnancy in  2020  9:
00 AM



 PCR      third trimester  CDT

 

 CBC WITH DIFFERENTIAL  LAB  Routine  High-risk pregnancy in  2020  9:00 
AM



       third trimester  CDT









 Health Maintenance  Due Date  Last Done  Comments

 

 INFLUENZA VACCINE (#1)  2020    Postponed from



       2019 (Refused)

 

 PAP SMEAR  08/15/2022  08/15/2019, 2017,  



     2014, Additional  



     history exists  

 

 DTaP,Tdap,and Td Vaccines  2030, 2010  



 (2 - Td)      

 

 PNEUMOCOCCAL 0-64 YEARS  Aged Out    No longer eligible



 COMBINED SERIES      based on patient's age



       to complete this topic



documented as of this encounter



Procedures







 Procedure Name  Priority  Date/Time  Associated  Comments



       Diagnosis  

 

 POCT URINALYSIS W/O  Routine  2020  9:02 AM  High-risk pregnancy  
Results for this



 SPECIFIC GRAVITY    CDT  in third trimester  procedure are in



         the results



         section.



documented in this encounter



Results

POCT URINALYSIS W/O SPECIFIC GRAVITY (2020  9:02 AM CDT)





 Component  Value  Ref Range  Performed At  Pathologist Signature

 

 POCT PH U  .  5 - 8 mg/dl    

 

 POCT U LEUK EST  .  Negative - Negative    

 

 POCT U NIT  .  Negative - Negative    

 

 POCT U PROT  trace  Negative - Negative    

 

 POCT U GLU  neg  Negative - Negative    

 

 POCT U KETONE  .  Negative - Negative    

 

 POCT U BLD  .  Negative - Negative    









 Specimen

 

 Urine - URINE, CLEAN CATCH



documented in this encounter



Visit Diagnoses







 Diagnosis

 

 High-risk pregnancy in third trimester - Primary

 

 Current pregnancy with history of pre-term labor in third trimester

 

 Rh negative, antepartum







 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



documented in this encounter



Insurance







 Payer  Benefit Plan /  Subscriber ID  Effective  Phone  Address  Type



   Group    Dates      

 

 [facility] [place]  10/1/2019-Pres    [address]  Medicaid



 HEALTH CHOICE -  HEALTH CHOICE    ent    [id number]



  



 MANAGED  MEDICAID        [place]          [id number]  









 Guarantor Name  Account Type  Relation to  Date of  Phone  Billing



     Patient  Birth    Address

 

 Mellissa Tobar  Personal/Family  Self  1990  [phone]  [address] [place] [address] [address]        (Home)  [place] [address]



documented as of this encounter



Advance Directives







 Name  Relationship  Healthcare Agent Relationship  Communication

 

 Gerhard Tobar  Father  Primary healthcare agent  [phone]



       (Mobile)[phone] (Home)

## 2020-03-25 NOTE — XMS REPORT
Summary of Care

 Created on:2020



Patient:Mellissa Tobar

Sex:Female

:1990

External Reference #:[id number]





Demographics







 Address  [address]



   [place] [address]

 

 Mobile Phone  [phone]

 

 Home Phone  [phone]

 

 Phone  [phone]

 

 Email Address  [email or web address]

 

 Email Address  [email or web address]

 

 Preferred Language  English

 

 Marital Status  

 

 Temple Affiliation  Unknown

 

 Race  White

 

 Ethnic Group  Not  or 









Author







 Organization  [facility]

 

 Address  [address] [place] [id number]









Support







 Name  Relationship  Address  Phone

 

 Gerhard Tobar  Unavailable  [address]   [phone]



     [place] [address]  

 

 Cruz Contreras  Unavailable  [address]  [phone]



     [place] [address]  









Care Team Providers







 Name  Role  Phone

 

 Doctor Unassigned, No Name  Medicaid Hmo  Unavailable

 

 Ernestina, Ashish Linderdede [facility]  Unavailable  Unavailable

 

 Jayy Price [facility]  Unavailable  [phone]

 

 Ami Alcala  Primary Care Provider  [phone]









Reason for Visit







 Reason  Comments

 

 NURSE VISIT  







Encounter Details







 Date  Type  Department  Care Team  Description

 

 2020  Nurse Visit  [facility]-  Ami Alcala, FNP



[address] [place] [address] [phone] (Fax)  History of 



     [place]



  



Visit, [facility] Nurse  labor (Primary Dx)



     [address] [place], [place]    



     [address] [phone]    







Allergies

No Known Allergiesdocumented as of this encounter (statuses as of 2020)



Medications







 Medication  Sig  Dispensed  Refills  Start Date  End Date  Status

 

 PNV 67-iron ps-folate  Take 1 capsule by  30 capsule  11  2019    Active



 no.1-dha (VITAFOL  mouth daily.          



 ULTRA) 29 mg iron- 1            



 mg-200 mg            



 CapIndications: High            



 risk pregnancy,            



 antepartum            

 

 proMETHazine 25 mg  Take 1 tablet by  30 tablet  3  2019    Active



 tabletIndications:  mouth every 4          



 Nausea/vomiting in  (four) hours as          



 pregnancy  needed for Nausea          



   and Vomiting          



   (N/V).          

 

 HYDROXYprogest,PF,,pre      0  2019    Active



 g presv, 250 mg/mL (1            



 mL) injection            









 Hospital, Clinic, or Other  Ordered Dose  Route  Frequency  Start Date  End 
Date  Status



 Facility Administered            



 Medication            

 

 HYDROXYprogest(PF)(preg  250 mg  IM  QWEEKLY  2019  Active



 presv) (JAYNA) 250 mg/mL            



 (1 mL) injection 250 mg            



documented as of this encounter (statuses as of 2020)



Active Problems







 Problem  Noted Date

 

 Rh negative, antepartum  10/23/2019

 

 History of fetal anomaly in prior pregnancy, currently pregnant  08/15/2019

 

 High risk pregnancy, antepartum  2017

 

 Current pregnancy with history of pre-term labor in second trimester  2017

 

 H/O cervical incompetence  2017

 

 Recurrent pregnancy loss, antepartum condition or complication  2017

 

 History of PID  2017

 

 Fetal demise, less than 22 weeks  2014









 Pregnant  Estimated Date of Delivery  Comments

 

 Yes  2020  Based on last menstrual period of 2019



     (Approximate)



documented as of this encounter (statuses as of 2020)



Resolved Problems







 Problem  Noted Date  Resolved Date

 

 H/O  delivery, currently pregnant, second trimester  10/10/2019  12/30/
2019

 

 Supervision of pregnancy with other poor reproductive or  2017  08/15/
2019



 obstetric history, third trimester    

 

 Preg care for patient w recurrent preg loss, third trimester  2017  08/15
/2019

 

 Suprvsn of preg w history of pre-term labor, third trimester  2017  08/15
/2019

 

 Rh negative state in antepartum period  2017  08/15/2019

 

 Rh negative state in antepartum period, first trimester  2017

 

 Well woman exam with routine gynecological exam  2017

 

 Screening for STD (sexually transmitted disease)  2017

 

 History of anemia  2017  08/15/2019

 

 Other general counseling and advice for contraceptive  2017



 management    

 

 Threatened  in early pregnancy  2015

 

 Unsure of last menstrual period as reason for ultrasound  2015



 scan    

 

 History of cervical incompetence in pregnancy, currently  2015



 pregnant, first trimester    

 

 Rh negative state in antepartum period, first trimester,  2015



 fetus 1    

 

 Pregnancy with other poor reproductive history  07/10/2014  2017

 

 High-risk pregnancy  07/10/2014  2017

 

 History of  delivery, currently pregnant  2014









 Overview: 







 Delivered at 21 weeks









 Rh negative status during pregnancy, antepartum  2014



documented as of this encounter (statuses as of 2020)



Immunizations







 Name  Administration Dates  Next Due

 

 Rho (d) Immune Globulin  2020, 2019, 08/10/2017  

 

 TDAP (ADACEL) VACCINE  2020  

 

 Td  2010  



documented as of this encounter



Social History







 Tobacco Use  Types  Packs/Day  Years Used  Date

 

 Former Smoker  Cigarettes  0.1  1  Quit: 2014









 Smokeless Tobacco: Never Used      









 Alcohol Use  Drinks/Week  oz/Week  Comments

 

 No  0 Standard drinks or equivalent  0.0  









 Pregnant  Estimated Date of Delivery  Comments

 

 Yes  2020  Based on last menstrual period of 2019



     (Approximate)









 Sex Assigned at Birth  Date Recorded

 

 Not on file  









 Job Start Date  Occupation  Industry

 

 Not on file  Not on file  Not on file









 Travel History  Travel Start  Travel End









 No recent travel history available.



documented as of this encounter



Last Filed Vital Signs







 Vital Sign  Reading  Time Taken  Comments

 

 Blood Pressure  110/71  2020  2:47 PM CST  

 

 Pulse  95  2020  2:47 PM CST  

 

 Temperature  37 C (98.6 F)  2020  2:47 PM CST  

 

 Respiratory Rate  18  2020  2:47 PM CST  

 

 Oxygen Saturation  -  -  

 

 Inhaled Oxygen Concentration  -  -  

 

 Weight  74.9 kg (165 lb 1 oz)  2020  2:47 PM CST  

 

 Height  165.1 cm (5' 5")  2020  2:47 PM CST  

 

 Body Mass Index  27.47  2020  2:47 PM CST  



documented in this encounter



Patient Instructions

Patient InstructionsLissy Oleary LVN - 2020  2:15 PM CST

Hydroxyprogesterone solution for injection

Brand Name: Jayna

What is this medicine?

HYDROXYPROGESTERONE (wily drox ee proe ZELALEM ter one) is a female hormone. This 
medicine is used in women who are pregnant and who have delivered a baby too 
early () in the past. It helps lower therisk of having a  baby 
again. This medicine is also used to treat irregular menstrual bleeding or a 
lack of menstrual bleeding in women. In some cases, it may be used to treat 
endometrial cancer.

How should I use this medicine?

This medicine is for injection into a muscle or under the skin. It is given by 
a health care professional in a hospital or clinic setting.

Talk to your pediatrician regarding the use of this medicine in children. 
Special care may be needed.

What side effects may I notice from receiving this medicine?

Side effects that you should report to your doctor or health care professional 
as soon as possible:

 allergic reactions like skin rash, itching or hives, swelling of the face, 
lips, or tongue

 breathing problems

 depressed mood

 increase in blood pressure

 increased hunger or thirst

 increased urination

 signs and symptoms of a blood clot such as breathing problems; changes in 
vision; chest pain; severe, sudden headache; pain, swelling, warmth in the leg; 
trouble speaking; sudden numbness or weakness of the face, arm or leg

 unusually weak or tired

 unusual vaginal bleeding

 yellowing of the eyes or skin

Side effects that usually do not require medical attention (report to your 
doctor or health care professional if they continue or are bothersome):

 diarrhea

 fluid retention and swelling

 nausea

 pain, redness, or irritation at site where injected

What may interact with this medicine?

Significant interactions are not expected.

What if I miss a dose?

It is important not to miss your dose. Call your doctor or health care 
professional if you are unable to keep an appointment.

Where should I keep my medicine?

This drug is given in a hospital or clinic and will not be stored at home.

What should I tell my health care provider before I take this medicine?

They need to know if you have any of these conditions:

 breast, cervical, uterine, or vaginal cancer

 depression

 diabetes or prediabetes

 heart disease

 high blood pressure

 history of blood clots

 kidney disease

 liver disease

 lung or breathing disease, like asthma

 migraine headaches

 seizures

 vaginal bleeding

 an unusual or allergic reaction to hydroxyprogesterone, other hormones, 
castor oil, other medicines, foods, dyes, or preservatives

 breast-feeding

What should I watch for while using this medicine?

Your condition will be monitored carefully while you are receiving this 
medicine.

NOTE:This sheet is a summary. It may not cover all possible information. If you 
have questions aboutthis medicine, talk to your doctor, pharmacist, or health 
care provider. Copyright 2018 [employer]



Electronically signed by Lissy Oleary LVN at 2020  2:30 PM CST

documented in this encounter



Progress Notes

Lissy Oleary LVN - 2020  2:15 PM CST30 year old female in clinic 
today for Jayna progesterone injection. 250 mg administered IM to leftgluteus- 
see MAR entry. Witnessed by YUE Segal RN.  Medication supplied by outside 
pharmacy.  Pt tolerated injection well, warning signs discussed with her at 
this time. Pt instructed to RTC 1 wk for next dose or PRN. Pt verbalized 
understanding.

Lissy Golden LVN  2020  2:49 PM

Electronically signed by Lissy Oleary LVN at 2020  2:50 PM 
CSTdocumented in this encounter



Plan of Treatment







 Date  Type  Specialty  Care Team  Description

 

 2020  Routine Prenatal Visit  [facility]  Ami Alcala, FNP



  



       [address]



  



       [address]



  



       [place] [address]



  



       [phone] (Fax)  

 

 2020  Nurse Visit  [facility]  Visit, [facility] Nurse  









 Health Maintenance  Due Date  Last Done  Comments

 

 INFLUENZA VACCINE (#1)  2020    Postponed from



       2019 (Refused)

 

 PAP SMEAR  08/15/2022  08/15/2019, 2017,  



     2014, Additional  



     history exists  

 

 DTaP,Tdap,and Td Vaccines  2030, 2010  



 (2 - Td)      

 

 PNEUMOCOCCAL 0-64 YEARS  Aged Out    No longer eligible



 COMBINED SERIES      based on patient's age



       to complete this topic



documented as of this encounter



Results

Not on filedocumented in this encounter



Visit Diagnoses







 Diagnosis

 

 History of  labor - Primary







 Personal history of pre-term labor



documented in this encounter



Administered Medications







 Medication Order  MAR Action  Action Date  Dose  Rate  Site

 

 HYDROXYprogest(PF)(preg  Given  2020  2:50 PM  250 mg    Left Upper Quad.



 presv) (JAYNA) 250    CST      Gluteus



 mg/mL (1 mL) injection          



 250 mg



          



 250 mg, Intramuscular,          



 QWEEKLY, 14 doses,          



 First dose on 19 at 1200, Last          



 dose on Mon 3/2/20 at          



 1200, Routine          









 Given  2020  3:36 PM CST  250 mg    Right Upper Quad. Gluteus

 

 Given  2020  3:02 PM CST  250 mg    Left Dorsogluteal-IM









   



documented in this encounter



Insurance







 Payer  Benefit Plan /  Subscriber ID  Effective  Phone  Address  Type



   Group    Dates      

 

 [facility]  [facility]  10/1/2019-Pres    [address]  Medicaid



 HEALTH CHOICE -  HEALTH CHOICE    [facility]    [id number]



  



 [place]NAGED [place]          [id number]  









 Guarantor Name  Account Type  Relation to  Date of  Phone  Billing



     Patient  Birth    Address

 

 Mellissa Tobar  Personal/Family  Self  1990  [phone]  [address] [place] [address] [address]        (Home)  [place] [address]



documented as of this encounter



Advance Directives







 Name  Relationship  Healthcare Agent Relationship  Communication

 

 Gerhard Harrell  Primary healthcare agent  [phone]



       (Mobile)[phone] (Home)

## 2020-03-25 NOTE — XMS REPORT
Summary of Care

 Created on:2020



Patient:Mellissa Tobar

Sex:Female

:1990

External Reference #:[id number]





Demographics







 Address  [address]



   [place] [address]

 

 Mobile Phone  [phone]

 

 Home Phone  [phone]

 

 Phone  [phone]

 

 Email Address  [email or web address]

 

 Email Address  [email or web address]

 

 Preferred Language  English

 

 Marital Status  

 

 Gnosticism Affiliation  Unknown

 

 Race  White

 

 Ethnic Group  Not  or 









Author







 Organization  [facility]

 

 Address  [address] [place] [address]









Support







 Name  Relationship  Address  Phone

 

 Gerhard Tobar  Unavailable  [address]  [phone]



     [place] [address]  

 

 Cruz Contreras  Unavailable  [address]  [phone]



     [place] [address]  









Care Team Providers







 Name  Role  Phone

 

 Doctor Unassigned, No Name  Medicaid Hmo  Unavailable

 

 Ernestina, [facility]  Unavailable  Unavailable

 

 Jayy Price FNP  Unavailable  [phone]

 

 Ami Alcala  Primary Care Provider  [phone]









Reason for Visit







 Reason  Comments

 

 Prenatal Care  







Encounter Details







 Date  Type  Department  Care Team  Description

 

 2020  Routine Prenatal  [facility]-  Ami Alcala  High-risk 
pregnancy in third trimester (Primary Dx);



   Visit  [facility]DAL[facility]



  Rh negative, antepartum;



     [address]



  [address]



  Current pregnancy with history of pre-term labor in third trimester



     [place]  [address] [place]



  



     [address]



  [place]  



     [phone] [address] [phone]  



       (Fax)  







Allergies

No Known Allergiesdocumented as of this encounter (statuses as of 2020)



Medications







 Medication  Sig  Dispensed  Refills  Start Date  End Date  Status

 

 PNV 67-iron ps-folate  Take 1 capsule by  30 capsule  11  2019    Active



 no.1-dha (VITAFOL  mouth daily.          



 ULTRA) 29 mg iron- 1            



 mg-200 mg            



 CapIndications: High            



 risk pregnancy,            



 antepartum            

 

 proMETHazine 25 mg  Take 1 tablet by  30 tablet  3  2019    Active



 tabletIndications:  mouth every 4          



 Nausea/vomiting in  (four) hours as          



 pregnancy  needed for Nausea          



   and Vomiting          



   (N/V).          

 

 HYDROXYprogest,PF,,pre      0  2019    Active



 g presv, 250 mg/mL (1            



 mL) injection            









 Hospital, Clinic, or Other  Ordered Dose  Route  Frequency  Start Date  End 
Date  Status



 Facility Administered            



 Medication            

 

 HYDROXYprogest(PF)(preg  250 mg  IM  QWEEKLY  2019  Active



 presv) (JAYNA) 250 mg/mL            



 (1 mL) injection 250 mg            



documented as of this encounter (statuses as of 2020)



Active Problems







 Problem  Noted Date

 

 Rh negative, antepartum  10/23/2019

 

 History of fetal anomaly in prior pregnancy, currently pregnant  08/15/2019

 

 High risk pregnancy, antepartum  2017

 

 Current pregnancy with history of pre-term labor in third trimester  2017

 

 H/O cervical incompetence  2017

 

 Recurrent pregnancy loss, antepartum condition or complication  2017

 

 History of PID  2017

 

 Fetal demise, less than 22 weeks  2014









 Pregnant  Estimated Date of Delivery  Comments

 

 Yes  2020  Based on last menstrual period of 2019



     (Approximate)



documented as of this encounter (statuses as of 2020)



Resolved Problems







 Problem  Noted Date  Resolved Date

 

 H/O  delivery, currently pregnant, second trimester  10/10/2019  12/30/
2019

 

 Supervision of pregnancy with other poor reproductive or  2017  08/15/
2019



 obstetric history, third trimester    

 

 Preg care for patient w recurrent preg loss, third trimester  2017  08/15
/2019

 

 Suprvsn of preg w history of pre-term labor, third trimester  2017  08/15
/2019

 

 Rh negative state in antepartum period  2017  08/15/2019

 

 Rh negative state in antepartum period, first trimester  2017

 

 Well woman exam with routine gynecological exam  2017

 

 Screening for STD (sexually transmitted disease)  2017

 

 History of anemia  2017  08/15/2019

 

 Other general counseling and advice for contraceptive  2017



 management    

 

 Threatened  in early pregnancy  2015

 

 Unsure of last menstrual period as reason for ultrasound  2015



 scan    

 

 History of cervical incompetence in pregnancy, currently  2015



 pregnant, first trimester    

 

 Rh negative state in antepartum period, first trimester,  2015



 fetus 1    

 

 Pregnancy with other poor reproductive history  07/10/2014  2017

 

 High-risk pregnancy  07/10/2014  2017

 

 History of  delivery, currently pregnant  2014









 Overview: 







 Delivered at 21 weeks









 Rh negative status during pregnancy, antepartum  2014



documented as of this encounter (statuses as of 2020)



Immunizations







 Name  Administration Dates  Next Due

 

 Rho (d) Immune Globulin  2020, 2019, 08/10/2017  

 

 TDAP (ADACEL) VACCINE  2020  

 

 Td  2010  



documented as of this encounter



Social History







 Tobacco Use  Types  Packs/Day  Years Used  Date

 

 Former Smoker  Cigarettes  0.1  1  Quit: 2014









 Smokeless Tobacco: Never Used      









 Alcohol Use  Drinks/Week  oz/Week  Comments

 

 No  0 Standard drinks or equivalent  0.0  









 Pregnant  Estimated Date of Delivery  Comments

 

 Yes  2020  Based on last menstrual period of 2019



     (Approximate)









 Sex Assigned at Birth  Date Recorded

 

 Not on file  









 Job Start Date  Occupation  Industry

 

 Not on file  Not on file  Not on file









 Travel History  Travel Start  Travel End









 No recent travel history available.



documented as of this encounter



Last Filed Vital Signs







 Vital Sign  Reading  Time Taken  Comments

 

 Blood Pressure  107/70  2020  3:57 PM CST  

 

 Pulse  100  2020  3:57 PM CST  

 

 Temperature  36.4 C (97.5 F)  2020  3:57 PM CST  

 

 Respiratory Rate  16  2020  3:57 PM CST  

 

 Oxygen Saturation  -  -  

 

 Inhaled Oxygen Concentration  -  -  

 

 Weight  75.3 kg (166 lb)  2020  3:57 PM CST  

 

 Height  165.1 cm (5' 5")  2020  3:57 PM CST  

 

 Body Mass Index  27.62  2020  3:57 PM CST  



documented in this encounter



Progress Notes

Ami Alcala FNP - 2020  3:45 PM CSTFormatting of this note might 
be different from the original.

Chief complaint:

Chief Complaint

Patient presents with

 Prenatal Care



HPI



Mellissa Tobar is a 30 year old female is a 

@ 31w2d here for prenatal visit. Patient's last menstrual period was 2019 
(approximate). Estimated Date of Delivery: 20



Today she denies any complaints or concerns.



She is taking PNV. She reports good FM. She denies any ctx/cramping, VB, LOF, HA
, visual disturbance, vaginal discharge or dysuria. She denies any foreign 
travel. She also denies any physical, sexual or emotional abuse.



Histories

OB History

 Para Term  AB Living

6 3 0 2 2 1

SAB TAB Ectopic Multiple Live Births

1 1 0 0 2



# Outcome Date GA Lbr Damon/2nd Weight Sex Delivery Anes PTL Lv

6 Current

5  17 31w6d  3 lb 11 oz (1.673 kg) F NORMAL SPONT   DEANGELO

4 SAB 07/01/15 11w0d

3 Para 14 17w0d    Vag-Spont   FD

2 TAB 08 11w0d

1  05 21w0d  1 lb 2 oz (0.51 kg) M NORMAL SPONT  Y ND

   Birth Comments: Incompentent cervix



Obstetric Comments

2005 (different partner)



,,, current pregnacy (current partner)



Past Medical History:

Diagnosis Date

 Anemia 

 resolved, related to pregnancy

 Anxiety

 self reported-no tx

 History of anemia 2017

 Menstrual disorder 2013

 irregular, heavy, and painful

 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 
2014

 Screening for STD (sexually transmitted disease) 2017



Family History

Problem Relation Age of Onset

 Other - see comments Mother

     cervical CA

 Ovarian Cancer Maternal Grandmother

 Breast Cancer Maternal Grandmother

 Arthritis Father

 Hypertension Father

 No Significant Medical Problems Sister

 Breast Cancer Paternal Grandmother

 Asthma NoFHx

 Birth defects NoFHx

 Colon Cancer NoFHx

 Cancer NoFHx

 Depression NoFHx

 Diabetes NoFHx

 Genetic NoFHx

 Heart NoFHx

 High cholesterol NoFHx

 Mental retardation NoFHx

 Neurological NoFHx

 Osteoporosis NoFHx

 Psychiatry NoFHx



Family Status

Relation Name Status

 Mo  Alive

 MGMo  Alive

 Fa  Alive

 Sis  Alive

 MAunt  Alive

 MUnc  Alive

 PAunt  Alive

 PUnc  Alive

 MGFa  Alive

 PGMo  Alive

 PGFa  Alive

 NoFHx  (Not Specified)



Past Surgical History:

Procedure Laterality Date

 DILATION AND CURETTAGE (SHX)  2008



Social History



Socioeconomic History

 Marital status: 

  Spouse name: Not on file

 Number of children: 0

 Years of education: 15

 Highest education level: Not on file

Occupational History

 Occupation: Unemployed

Social Needs

 Financial resource strain: Not on file

 Food insecurity:

  Worry: Not on file

  Inability: Not on file

 Transportation needs:

  Medical: Not on file

  Non-medical: Not on file

Tobacco Use

 Smoking status: Former Smoker

  Packs/day: 0.10

  Years: 1.00

  Pack years: 0.10

  Types: Cigarettes

  Last attempt to quit: 2014

  Years since quittin.7

 Smokeless tobacco: Never Used

Substance and Sexual Activity

 Alcohol use: No

  Alcohol/week: 0.0 standard drinks

 Drug use: No

 Sexual activity: Yes

  Partners: Male

  Birth control/protection: None

  Comment: last sexual intercourse 2019

Lifestyle

 Physical activity:

  Days per week: Not on file

  Minutes per session: Not on file

 Stress: Not on file

Relationships

 Social connections:

  Talks on phone: Not on file

  Gets together: Not on file

  Attends Buddhist service: Not on file

  Active member of club or organization: Not on file

  Attends meetings of clubs or organizations: Not on file

  Relationship status: Not on file

 Intimate partner violence:

  Fear of current or ex partner: Not on file

  Emotionally abused: Not on file

  Physically abused: Not on file

  Forced sexual activity: Not on file

Other Topics Concern

 Not on file

Social History Narrative

 Denies domestic violence or abuse

 No exposure to cats

 No Buddhist preference

 Patient lives with father and child.



Social History



Substance and Sexual Activity

Sexual Activity Yes

 Partners: Male

 Birth control/protection: None

 Comment: last sexual intercourse 2019







Labs

No new labs and I have reviewed the patient's labs.



Radiology

I have reviewed the patient's radiology. growth scan reviewed from 1 month ago, 
EFW 30th%



Allergies

Mellissa has No Known Allergies.



Medications

Mellissa has a current medication list which includes the following 
prescription(s): hydroxyprogest(pf)(preg presv), promethazine, and pnv 67-iron 
ps-folate no.1-dha, and the following Facility-Administered Medications: 
hydroxyprogest(pf)(preg presv).



Review of Systems

Constitutional: Negative for appetite change, fatigue and fever.

Eyes: Negative for visual disturbance.

Respiratory: Negative.

Cardiovascular: Negative for palpitations and leg swelling.

Gastrointestinal: Negative for abdominal pain, constipation, diarrhea, nausea 
and vomiting.

Genitourinary: Negative.  Negative for dysuria, vaginal bleeding, vaginal 
discharge and pelvic pain.

Musculoskeletal: Negative.

Skin: Negative for rash.

Neurological: Negative for dizziness, light-headedness and headaches.

Psychiatric/Behavioral: Negative.



/70 (BP Location: Right arm, Patient Position: Sitting, BP CUFF SIZE: 
Adult Medium)  | Pulse 100  | Temp 36.4 C (97.5 F) (Oral)  | Resp 16  | Ht 5
' 5" (1.651 m)  | Wt 166 lb (75.3 kg)  | LMP 2019 (Approximate)  | BMI 
27.62 kg/m

Pregravid BMI: 24.6



Physical Exam

Vitals reviewed.

Constitutional: She is oriented to person, place, and time. She appears well-
developed and well-nourished.

See prenatal flowsheet

Cardiovascular: No peripheral edema present.

Pulmonary/Chest: Normal inspiratory effort.

Abdominal: Abdomen is soft.

Neuro/Psychiatric: She has a normal mood and affect. She is oriented to person, 
place, and time.

Skin: Skin normal.





Assessment/Plan



1. High-risk pregnancy in third trimester

31w2d

Labor precautions, FKC and PIH warnings reviewed.



- POCT URINALYSIS GLUCOSE &amp; PROTEIN



2. Rh negative, antepartum

Rhogam given 20



3. Current pregnancy with history of pre-term labor in third trimester

On weekly 17OH injections

Denies s/s PTL



Return to clinic in 1 weeks.

Reviewed patient instructions and provided printed copy.

Pregnancy at  31w2d



This visit did not involve counseling and coordination that comprised more than 
50% of the visit time.

Electronically signed by Ami Alcala FNP at 2020  4:23 PM 
CSTdocumented in this encounter



Plan of Treatment







 Date  Type  Specialty  Care Team  Description

 

 2020  Nurse Visit  [facility]  Visit, Elagisella Nurse  

 

 2020  Routine Prenatal Visit  [facility]  Ami Alcala FNP



  



       [address]



  



       [place] [address]



  



       [phone] (Fax)  









 Health Maintenance  Due Date  Last Done  Comments

 

 INFLUENZA VACCINE (#1)  2020    Postponed from



       2019 (Refused)

 

 PAP SMEAR  08/15/2022  08/15/2019, 2017,  



     2014, Additional  



     history exists  

 

 DTaP,Tdap,and Td Vaccines  2030, 2010  



 (2 - Td)      

 

 PNEUMOCOCCAL 0-64 YEARS  Aged Out    No longer eligible



 COMBINED SERIES      based on patient's age



       to complete this topic



documented as of this encounter



Procedures







 Procedure Name  Priority  Date/Time  Associated  Comments



       Diagnosis  

 

 POCT URINALYSIS  Routine  2020  3:59 PM  High-risk pregnancy  Results 
for this



 GLUCOSE & PROTEIN    CST  in third trimester  procedure are in



         the results



         section.



documented in this encounter



Results

POCT URINALYSIS GLUCOSE &amp; PROTEIN (2020  3:59 PM CST)





 Component  Value  Ref Range  Performed At  Pathologist Signature

 

 POCT U PROT  trace  Negative - Negative    

 

 POCT U GLU  neg  Negative - Negative    









 Specimen

 

 Urine - URINE, CLEAN CATCH



documented in this encounter



Visit Diagnoses







 Diagnosis

 

 High-risk pregnancy in third trimester - Primary

 

 Rh negative, antepartum







 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

 

 Current pregnancy with history of pre-term labor in third trimester



documented in this encounter



Insurance







 Payer  Benefit Plan /  Subscriber ID  Effective  Phone  Address  Type



   Group    Dates      

 

 [facility]  xxxxxxxxx  10/1/2019-Pres    [address]  Medicaid



 HEALTH CHOICE -  HEALTH CHOICE    [facility]    [id number]



  



 [facility]  MEDICAID        [place], [place]          [id number]  









 Guarantor Name  Account Type  Relation to  Date of  Phone  Billing



     Patient  Birth    Address

 

 Mellissa Tobar  Personal/Family  Self  1990  [phone]  [address] [place] [address] [address]        (Home)  [place] [address]



documented as of this encounter



Advance Directives







 Name  Relationship  Healthcare Agent Relationship  Communication

 

 Gerhard Tobar  Father  Primary healthcare agent  [phone]



       (Mobile)[phone] (Home)

## 2020-03-25 NOTE — XMS REPORT
Summary of Care

 Created on:2019



Patient:Mellissa Tobar

Sex:Female

:1990

External Reference #:[id number]





Demographics







 Address  [address] 



   [place] [address]

 

 Mobile Phone  [phone]

 

 Home Phone  [phone]

 

 Phone  [phone]

 

 Email Address  [email or web address]

 

 Email Address  [email or web address]

 

 Preferred Language  English

 

 Marital Status  Single

 

 Mormon Affiliation  Unknown

 

 Race  White

 

 Ethnic Group  Not  or 









Author







 Organization  [facility]

 

 Address  [address] [place] [address]









Support







 Name  Relationship  Address  Phone

 

 Gerhard Tobar  Unavailable  [address]   [phone]



     [place] [address]  

 

 Cruz Contreras  Unavailable  [address]  [phone]



     [place] [address]  









Care Team Providers







 Name  Role  Phone

 

 Doctor Unassigned, No Name  Medicaid Hmo  Unavailable

 

 Faculty, Ashish [facility]dede [facility]  Unavailable  Unavailable

 

 Jayy Price FNP  Unavailable  [phone]

 

 Ami Alcala FN  Primary Care Provider  [phone]









Reason for Referral

 (Routine)





 Status  Reason  Specialty  Diagnoses /  Referred By  Referred To



       Procedures  Contact  Contact

 

 New Request    Maternal Fetal  Diagnoses



High risk pregnancy, antepartum  Ami Alcala  



Procedures



CONSULT MATERNAL FETAL MEDICINE ULTRASOUND Preferred Location: MAGDALENA Watts



  



         [address]



  



         [place]  



         [address]



  



         Phone:  



         [phone]



  



         Fax:  



         [phone]  









Reason for Visit







 Reason  Comments

 

 Assessment  nausea and vomit

 

 Referral/consult  referral for usg







Encounter Details







 Date  Type  Department  Care Team  Description

 

 2019  Telephone  [facility]-  Ami Alcala,  Assessment (
nausea and



     [facility]



  vomit);



     [address]



  [address]



  Referral/consult



     [place], [place]  [address]



  (referral for usg)



     [id number]



  [place], [place] [id number] [phone] (Fax)  







Allergies

No Known Allergiesdocumented as of this encounter (statuses as of 2019)



Medications







 Medication  Sig  Dispensed  Refills  Start Date  End Date  Status

 

 proMETHazine 25 mg  Take 1 tablet  30 tablet  3  2019    Active



 tabletIndications:  by mouth every          



 Nausea/vomiting in  4 (four) hours          



 pregnancy  as needed for          



   Nausea and          



   Vomiting          



   (N/V).          

 

 PNV 67-iron  Take 1 capsule  30 capsule  11  2019    Active



 ps-folate no.1-dha  by mouth          



 (VITAFOL ULTRA) 29  daily.          



 mg iron- 1 mg-200            



 mg CapIndications:            



 High risk            



 pregnancy,            



 antepartum            

 

 prenatal vit  Take  by    0    2019  Discontinued



 calc,iron,folic  mouth.          



 (PRENATAL VITAMIN            



 ORAL)            



documented as of this encounter (statuses as of 2019)



Active Problems







 Problem  Noted Date

 

 History of fetal anomaly in prior pregnancy, currently pregnant  08/15/2019

 

 High risk pregnancy, antepartum  2017

 

 Current pregnancy with history of pre-term labor in first trimester  2017

 

 Rh negative state in antepartum period, first trimester  2017

 

 H/O cervical incompetence  2017

 

 Recurrent pregnancy loss, antepartum condition or complication  2017

 

 History of PID  2017

 

 Fetal demise, less than 22 weeks  2014









 Pregnant  Estimated Date of Delivery  Comments

 

 Yes  2020  Based on last menstrual period of 2019



     (Approximate)



documented as of this encounter (statuses as of 2019)



Resolved Problems







 Problem  Noted Date  Resolved Date

 

 Supervision of pregnancy with other poor reproductive or  2017  08/15/
2019



 obstetric history, third trimester    

 

 Preg care for patient w recurrent preg loss, third trimester  2017  08/15
/2019

 

 Suprvsn of preg w history of pre-term labor, third trimester  2017  08/15
/2019

 

 Rh negative state in antepartum period  2017  08/15/2019

 

 Well woman exam with routine gynecological exam  2017

 

 Screening for STD (sexually transmitted disease)  2017

 

 History of anemia  2017  08/15/2019

 

 Other general counseling and advice for contraceptive  2017



 management    

 

 Threatened  in early pregnancy  2015

 

 Unsure of last menstrual period as reason for ultrasound  2015



 scan    

 

 History of cervical incompetence in pregnancy, currently  2015



 pregnant, first trimester    

 

 Rh negative state in antepartum period, first trimester,  2015



 fetus 1    

 

 Pregnancy with other poor reproductive history  07/10/2014  2017

 

 High-risk pregnancy  07/10/2014  2017

 

 History of  delivery, currently pregnant  2014









 Overview: 







 Delivered at 21 weeks









 Rh negative status during pregnancy, antepartum  2014



documented as of this encounter (statuses as of 2019)



Immunizations







 Name  Administration Dates  Next Due

 

 Rho (d) Immune Globulin  08/10/2017  

 

 Td  2010  



documented as of this encounter



Social History







 Tobacco Use  Types  Packs/Day  Years Used  Date

 

 Former Smoker  Cigarettes  0.1  1  Quit: 2014









 Smokeless Tobacco: Never Used      









 Alcohol Use  Drinks/Week  oz/Week  Comments

 

 No  0 Standard drinks or equivalent  0.0  









 Pregnant  Estimated Date of Delivery  Comments

 

 Yes  2020  Based on last menstrual period of 2019



     (Approximate)









 Sex Assigned at Birth  Date Recorded

 

 Not on file  









 Job Start Date  Occupation  Industry

 

 Not on file  Not on file  Not on file









 Travel History  Travel Start  Travel End









 No recent travel history available.



documented as of this encounter



Last Filed Vital Signs

Not on filedocumented in this encounter



Plan of Treatment







 Date  Type  Specialty  Care Team  Description

 

 2019  Routine Prenatal Visit  [facility]  Ami Alcala, FNP



  



       [address]



  



       [place] [address]



  



       [phone] (Fax)  









 Health Maintenance  Due Date  Last Done  Comments

 

 INFLUENZA VACCINE (#1)  2019    

 

 DTaP,Tdap,and Td Vaccines  2019  Postponed from



 (1 - Tdap)      2010 (Pregnant or



       Breastfeeding)

 

 PAP SMEAR  08/15/2022  08/15/2019, 2017,  



     2014, Additional  



     history exists  

 

 PNEUMOCOCCAL 0-64 YEARS  Aged Out    No longer eligible



 COMBINED SERIES      based on patient's age



       to complete this topic



documented as of this encounter



Results

Not on filedocumented in this encounter



Visit Diagnoses







 Diagnosis

 

 High risk pregnancy, antepartum - Primary

 

 Nausea/vomiting in pregnancy







 Unspecified vomiting of pregnancy, unspecified as to episode of care



documented in this encounter



Insurance







 Payer  Benefit Plan /  Subscriber ID  Effective Dates  Phone  Address  Type



   Group          

 

 [employer]  xxxxxxxxx  2019-Present  [phone]  [address]  [place]icaid [place]        2006



  



           [place], [place]  



           [address]  



documented as of this encounter



Advance Directives







 Name  Relationship  Healthcare Agent Relationship  Communication

 

 Gerhard Tobar  Father  Primary healthcare agent  [phone]



       (Mobile)[phone] (Home)

 

 Cruz Contreras  Other  First alternate healthcare  [phone]



     agent  (Mobile)[phone] (Home)

## 2020-03-25 NOTE — XMS REPORT
Summary of Care

 Created on:2020



Patient:Mellissa Tobar

Sex:Female

:1990

External Reference #:[id number]





Demographics







 Address  [address] 



   [place] [address]

 

 Mobile Phone  [phone]

 

 Home Phone  [phone]

 

 Phone  [phone]

 

 Email Address  [email or web address]

 

 Email Address  [email or web address]

 

 Preferred Language  English

 

 Marital Status  

 

 Sabianism Affiliation  Unknown

 

 Race  White

 

 Ethnic Group  Not  or 









Author







 Organization  [facility]

 

 Address  [address] [place] [address]









Support







 Name  Relationship  Address  Phone

 

 Gerhard Tobar  Unavailable  [address]  [phone]



     [place] [address]  

 

 Cruz Contreras  Unavailable  [address]  [phone]



     [place] [address]  









Care Team Providers







 Name  Role  Phone

 

 Doctor Unassigned, No Name  Medicaid Hmo  Unavailable

 

 Ernestina, Ashish Rmchp Mfm  Unavailable  Unavailable

 

 Jayy Price FNP  Unavailable  [phone]

 

 Ami Alcala FNP  Primary Care Provider  [phone]









Reason for Visit

Auth/Cert





 Status  Reason  Specialty  Diagnoses / Procedures  Referred By Contact  
Referred To Contact

 

     Obstetrics      [facility]



           Delivery







           [address] [place] [address]



           [place] [address]







           Phone: [phone]







           Fax: [phone]









Encounter Details







 Date  Type  Department  Care Team  Description

 

 2020 -  Hospital Encounter  [facility]  CardozaCleo MD



  



 2020    Delivery Unit



  [address] [place] [address]



  [facility] [address]



  



     [phone]  [place] [address]



  



       [phone] (Fax)  







Allergies

No Known Allergiesdocumented as of this encounter (statuses as of 2020)



Medications







 Medication  Sig  Dispensed  Refills  Start Date  End Date  Status

 

 PNV 67-iron ps-folate  Take 1 capsule by  30 capsule  11  2019    Active



 no.1-dha (VITAFOL  mouth daily.          



 ULTRA) 29 mg iron- 1            



 mg-200 mg            



 CapIndications: High            



 risk pregnancy,            



 antepartum            

 

 proMETHazine 25 mg  Take 1 tablet by  30 tablet  3  2019    Active



 tabletIndications:  mouth every 4          



 Nausea/vomiting in  (four) hours as          



 pregnancy  needed for Nausea          



   and Vomiting          



   (N/V).          



documented as of this encounter (statuses as of 2020)



Active Problems







 Problem  Noted Date

 

 Rh negative, antepartum  10/23/2019

 

 History of fetal anomaly in prior pregnancy, currently pregnant  08/15/2019

 

 High risk pregnancy, antepartum  2017

 

 Current pregnancy with history of pre-term labor in third trimester  2017

 

 H/O cervical incompetence  2017

 

 Recurrent pregnancy loss, antepartum condition or complication  2017

 

 History of PID  2017

 

 Fetal demise, less than 22 weeks  2014









 Pregnant  Estimated Date of Delivery  Comments

 

 Yes  2020  Based on last menstrual period of 2019



     (Approximate)



documented as of this encounter (statuses as of 2020)



Resolved Problems







 Problem  Noted Date  Resolved Date

 

 H/O  delivery, currently pregnant, second trimester  10/10/2019  12/30/
2019

 

 Supervision of pregnancy with other poor reproductive or  2017  08/15/
2019



 obstetric history, third trimester    

 

 Preg care for patient w recurrent preg loss, third trimester  2017  08/15
/2019

 

 Suprvsn of preg w history of pre-term labor, third trimester  2017  08/15
/2019

 

 Rh negative state in antepartum period  2017  08/15/2019

 

 Rh negative state in antepartum period, first trimester  2017

 

 Well woman exam with routine gynecological exam  2017

 

 Screening for STD (sexually transmitted disease)  2017

 

 History of anemia  2017  08/15/2019

 

 Other general counseling and advice for contraceptive  2017



 management    

 

 Threatened  in early pregnancy  2015

 

 Unsure of last menstrual period as reason for ultrasound  2015



 scan    

 

 History of cervical incompetence in pregnancy, currently  2015



 pregnant, first trimester    

 

 Rh negative state in antepartum period, first trimester,  2015



 fetus 1    

 

 Pregnancy with other poor reproductive history  07/10/2014  2017

 

 High-risk pregnancy  07/10/2014  2017

 

 History of  delivery, currently pregnant  2014









 Overview: 







 Delivered at 21 weeks









 Rh negative status during pregnancy, antepartum  2014



documented as of this encounter (statuses as of 2020)



Immunizations







 Name  Administration Dates  Next Due

 

 Rho (d) Immune Globulin  2020, 2019, 08/10/2017  

 

 TDAP (ADACEL) VACCINE  2020  

 

 Td  2010  



documented as of this encounter



Social History







 Tobacco Use  Types  Packs/Day  Years Used  Date

 

 Former Smoker  Cigarettes  0.1  1  Quit: 2014









 Smokeless Tobacco: Never Used      









 Alcohol Use  Drinks/Week  oz/Week  Comments

 

 No  0 Standard drinks or equivalent  0.0  









 Pregnant  Estimated Date of Delivery  Comments

 

 Yes  2020  Based on last menstrual period of 2019



     (Approximate)









 Sex Assigned at Birth  Date Recorded

 

 Not on file  









 Job Start Date  Occupation  Industry

 

 Not on file  Not on file  Not on file









 Travel History  Travel Start  Travel End









 No recent travel history available.



documented as of this encounter



Last Filed Vital Signs







 Vital Sign  Reading  Time Taken  Comments

 

 Blood Pressure  116/70  2020 11:21 PM CDT  

 

 Pulse  80  2020 12:30 AM CDT  

 

 Temperature  36.9 C (98.4 F)  2020 11:21 PM CDT  

 

 Respiratory Rate  18  2020 11:21 PM CDT  

 

 Oxygen Saturation  100%  2020 12:30 AM CDT  

 

 Inhaled Oxygen Concentration  -  -  

 

 Weight  77.8 kg (171 lb 9.6 oz)  2020 11:21 PM CDT  

 

 Height  165.1 cm (5' 5")  2020 11:21 PM CDT  

 

 Body Mass Index  28.56  2020 11:21 PM CDT  



documented in this encounter



Plan of Treatment







 Health Maintenance  Due Date  Last Done  Comments

 

 INFLUENZA VACCINE (#1)  2020    Postponed from



       2019 (Refused)

 

 PAP SMEAR  08/15/2022  08/15/2019, 2017,  



     2014, Additional  



     history exists  

 

 DTaP,Tdap,and Td Vaccines  2030, 2010  



 (2 - Td)      

 

 PNEUMOCOCCAL 0-64 YEARS  Aged Out    No longer eligible



 COMBINED SERIES      based on patient's age



       to complete this topic



documented as of this encounter



Procedures







 Procedure Name  Priority  Date/Time  Associated Diagnosis  Comments

 

 [facility] ONLY - FERN  Routine  2020 11:42 PM    Results for this



 TEST    CDT    procedure are in



         the results



         section.

 

 [facility] CLC OR [facility]CC ONLY  Routine  2020 11:42 PM    Results for this



 - WET PREP    CDT    procedure are in



         the results



         section.



documented in this encounter



Results

[facility] CLC OR [facility] ONLY - WET PREP (2020 11:42 PM CDT)





 Component  Value  Ref Range  Performed At  Pathologist Signature

 

 Wet Prep  Occasional (Rare)    [facility]  



   Epithelial cells    [facility] LABOR[facility]  



   present      

 

 Wet Prep  Few WBC per high-power    [facility] LABORATORY  

 

 Wet Prep  Occasional (Rare) Red    [facility]  



   blood cells    [facility] LABOR[facility]  

 

 Wet Prep  Few Organisms    [facility]  



   seenComment: BACTERIA    HOSPITAL LABORATORY  

 

 Wet Prep  No Yeast    [facility] LABORAT[facility]  

 

 Wet Prep  No Clue cells present    [facility] LABORATORY  

 

 Wet Prep  No Trichomonas    [facility]  



   vaginalis present    HOSPITAL LABORATORY  









 Specimen

 

 Fluid - CERVIX









 Performing Organization  Address  City/[facility]/Zipcode  Phone Number

 

 [facility]  CLIA: [id number], [address]  [place], [place] [id number] [phone]
[phone]



 LABORATORY  [address]    



[facility] ONLY - FERN TEST (2020 11:42 PM CDT)





 Component  Value  Ref Range  Performed At  Pathologist Signature

 

 Fern Test  Positive    [facility]  



       LABORATORY  









 Specimen

 

 Fluid - VAGINA









 Performing Organization  Address  City/[facility]/Zipcode  Phone Number

 

 [facility]  CLIA: [id number], [address]  [place], [place] [address] [phone]
[phone]



 LABORATORY  [address]    



documented in this encounter



Insurance







 Payer  Benefit Plan /  Subscriber ID  Effective  Phone  Address  Type



   Group    Dates      

 

 COMMUNITY  COMMUNITY  xxxxxxxxx  10/1/2019-Pres    [address]  Medicaid



 HEALTH CHOICE -  HEALTH CHOICE    ent    [id number]



  



 MANAGED  MEDICAID        [place]          [id number]  









 Guarantor Name  Account Type  Relation to  Date of  Phone  Billing



     Patient  Birth    Address

 

 Mellissa Tobar  Personal/Family  Self  1990  [phone]  [address] [place] [address] [address]. [address]        ([place])  [place] [address]



documented as of this encounter



Advance Directives







 Name  Relationship  Healthcare Agent Relationship  Communication

 

 Gerhard Harrell  Primary healthcare agent  [phone]



       (Mobile)[phone] (Home)

## 2020-03-25 NOTE — XMS REPORT
Summary of Care

 Created on:2020



Patient:Mellissa Tobar

Sex:Female

:1990

External Reference #:[id number]





Demographics







 Address  [address] [place] [address]

 

 Mobile Phone  [phone]

 

 Home Phone  [phone]

 

 Phone  [phone]

 

 Email Address  [email or web address]

 

 Email Address  [email or web address]

 

 Preferred Language  English

 

 Marital Status  

 

 Latter day Affiliation  Unknown

 

 Race  White

 

 Ethnic Group  Not  or 









Author







 Organization  [facility]

 

 Address  [address] [place] [address]









Support







 Name  Relationship  Address  Phone

 

 Gerhard Tobar  Unavailable  [address]   [phone]



     [place] [address]  

 

 Cruz Contreras  Unavailable  [address]  [phone]



     [place] [address]  









Care Team Providers







 Name  Role  Phone

 

 Doctor Unassigned, No Name  Medicaid Hmo  Unavailable

 

 Ernestina, Ashish Linderdede [facility]  Unavailable  Unavailable

 

 Jayy Price FNP  Unavailable  [phone]

 

 Ami Alcala FN  Primary Care Provider  [phone]









Reason for Visit







 Reason  Comments

 

 NURSE VISIT  







Encounter Details







 Date  Type  Department  Care Team  Description

 

 2020  Nurse Visit  [facility]-  Windy Mas, CNP



[address] [place] [address] [phone] (Fax)  History of 



     [place]



  



Visit, [facility] Nurse  labor (Primary Dx)



     [address] [place], [place]    



     [address] [phone]    







Allergies

No Known Allergiesdocumented as of this encounter (statuses as of 2020)



Medications







 Medication  Sig  Dispensed  Refills  Start Date  End Date  Status

 

 PNV 67-iron ps-folate  Take 1 capsule by  30 capsule  11  2019    Active



 no.1-dha (VITAFOL  mouth daily.          



 ULTRA) 29 mg iron- 1            



 mg-200 mg            



 CapIndications: High            



 risk pregnancy,            



 antepartum            

 

 proMETHazine 25 mg  Take 1 tablet by  30 tablet  3  2019    Active



 tabletIndications:  mouth every 4          



 Nausea/vomiting in  (four) hours as          



 pregnancy  needed for Nausea          



   and Vomiting          



   (N/V).          

 

 HYDROXYprogest,PF,,pre      0  2019    Active



 g presv, 250 mg/mL (1            



 mL) injection            









 Hospital, Clinic, or Other  Ordered Dose  Route  Frequency  Start Date  End 
Date  Status



 Facility Administered            



 Medication            

 

 HYDROXYprogest(PF)(preg  250 mg  IM  QWEEKLY  2019  Active



 presv) (JAYNA) 250 mg/mL            



 (1 mL) injection 250 mg            



documented as of this encounter (statuses as of 2020)



Active Problems







 Problem  Noted Date

 

 Rh negative, antepartum  10/23/2019

 

 History of fetal anomaly in prior pregnancy, currently pregnant  08/15/2019

 

 High risk pregnancy, antepartum  2017

 

 Current pregnancy with history of pre-term labor in third trimester  2017

 

 H/O cervical incompetence  2017

 

 Recurrent pregnancy loss, antepartum condition or complication  2017

 

 History of PID  2017

 

 Fetal demise, less than 22 weeks  2014









 Pregnant  Estimated Date of Delivery  Comments

 

 Yes  2020  Based on last menstrual period of 2019



     (Approximate)



documented as of this encounter (statuses as of 2020)



Resolved Problems







 Problem  Noted Date  Resolved Date

 

 H/O  delivery, currently pregnant, second trimester  10/10/2019  12/30/
2019

 

 Supervision of pregnancy with other poor reproductive or  2017  08/15/
2019



 obstetric history, third trimester    

 

 Preg care for patient w recurrent preg loss, third trimester  2017  08/15
/2019

 

 Suprvsn of preg w history of pre-term labor, third trimester  2017  08/15
/2019

 

 Rh negative state in antepartum period  2017  08/15/2019

 

 Rh negative state in antepartum period, first trimester  2017

 

 Well woman exam with routine gynecological exam  2017

 

 Screening for STD (sexually transmitted disease)  2017

 

 History of anemia  2017  08/15/2019

 

 Other general counseling and advice for contraceptive  2017



 management    

 

 Threatened  in early pregnancy  2015

 

 Unsure of last menstrual period as reason for ultrasound  2015



 scan    

 

 History of cervical incompetence in pregnancy, currently  2015



 pregnant, first trimester    

 

 Rh negative state in antepartum period, first trimester,  2015



 fetus 1    

 

 Pregnancy with other poor reproductive history  07/10/2014  2017

 

 High-risk pregnancy  07/10/2014  2017

 

 History of  delivery, currently pregnant  2014









 Overview: 







 Delivered at 21 weeks









 Rh negative status during pregnancy, antepartum  2014



documented as of this encounter (statuses as of 2020)



Immunizations







 Name  Administration Dates  Next Due

 

 Rho (d) Immune Globulin  2020, 2019, 08/10/2017  

 

 TDAP (ADACEL) VACCINE  2020  

 

 Td  2010  



documented as of this encounter



Social History







 Tobacco Use  Types  Packs/Day  Years Used  Date

 

 Former Smoker  Cigarettes  0.1  1  Quit: 2014









 Smokeless Tobacco: Never Used      









 Alcohol Use  Drinks/Week  oz/Week  Comments

 

 No  0 Standard drinks or equivalent  0.0  









 Pregnant  Estimated Date of Delivery  Comments

 

 Yes  2020  Based on last menstrual period of 2019



     (Approximate)









 Sex Assigned at Birth  Date Recorded

 

 Not on file  









 Job Start Date  Occupation  Industry

 

 Not on file  Not on file  Not on file









 Travel History  Travel Start  Travel End









 No recent travel history available.



documented as of this encounter



Last Filed Vital Signs







 Vital Sign  Reading  Time Taken  Comments

 

 Blood Pressure  113/63  2020  4:08 PM CST  

 

 Pulse  86  2020  4:08 PM CST  

 

 Temperature  36.4 C (97.6 F)  2020  4:08 PM CST  

 

 Respiratory Rate  16  2020  4:08 PM CST  

 

 Oxygen Saturation  -  -  

 

 Inhaled Oxygen Concentration  -  -  

 

 Weight  76.4 kg (168 lb 8 oz)  2020  4:08 PM CST  

 

 Height  165.1 cm (5' 5")  2020  4:08 PM CST  

 

 Body Mass Index  28.04  2020  4:08 PM CST  



documented in this encounter



Patient Instructions

Patient InstructionsLissy Oleary LVN - 2020  3:30 PM CST

Hydroxyprogesterone solution for injection

Brand Name: Jayna

What is this medicine?

HYDROXYPROGESTERONE (wily drox ee proe ZELALEM ter one) is a female hormone. This 
medicine is used in women who are pregnant and who have delivered a baby too 
early () in the past. It helps lower therisk of having a  baby 
again. This medicine is also used to treat irregular menstrual bleeding or a 
lack of menstrual bleeding in women. In some cases, it may be used to treat 
endometrial cancer.

How should I use this medicine?

This medicine is for injection into a muscle or under the skin. It is given by 
a health care professional in a hospital or clinic setting.

Talk to your pediatrician regarding the use of this medicine in children. 
Special care may be needed.

What side effects may I notice from receiving this medicine?

Side effects that you should report to your doctor or health care professional 
as soon as possible:

 allergic reactions like skin rash, itching or hives, swelling of the face, 
lips, or tongue

 breathing problems

 depressed mood

 increase in blood pressure

 increased hunger or thirst

 increased urination

 signs and symptoms of a blood clot such as breathing problems; changes in 
vision; chest pain; severe, sudden headache; pain, swelling, warmth in the leg; 
trouble speaking; sudden numbness or weakness of the face, arm or leg

 unusually weak or tired

 unusual vaginal bleeding

 yellowing of the eyes or skin

Side effects that usually do not require medical attention (report to your 
doctor or health care professional if they continue or are bothersome):

 diarrhea

 fluid retention and swelling

 nausea

 pain, redness, or irritation at site where injected

What may interact with this medicine?

Significant interactions are not expected.

What if I miss a dose?

It is important not to miss your dose. Call your doctor or health care 
professional if you are unable to keep an appointment.

Where should I keep my medicine?

This drug is given in a hospital or clinic and will not be stored at home.

What should I tell my health care provider before I take this medicine?

They need to know if you have any of these conditions:

 breast, cervical, uterine, or vaginal cancer

 depression

 diabetes or prediabetes

 heart disease

 high blood pressure

 history of blood clots

 kidney disease

 liver disease

 lung or breathing disease, like asthma

 migraine headaches

 seizures

 vaginal bleeding

 an unusual or allergic reaction to hydroxyprogesterone, other hormones, 
castor oil, other medicines, foods, dyes, or preservatives

 breast-feeding

What should I watch for while using this medicine?

Your condition will be monitored carefully while you are receiving this 
medicine.

NOTE:This sheet is a summary. It may not cover all possible information. If you 
have questions aboutthis medicine, talk to your doctor, pharmacist, or health 
care provider. Copyright 2018 Elsevier



Electronically signed by Lissy Oleary LVN at 2020  4:08 PM CST

documented in this encounter



Progress Notes

Lissy Oleary LVN - 2020  3:30 PM CST30 year old female in clinic 
today for [place] progesterone injection. 250 mg administered IM to right gluteus
- see MAR entry. Witnessed by HUANG Erickson RN.  Medication supplied by outside 
pharmacy.  Pttolerated injection well, warning signs discussed with her at this 
time. Pt instructed to RTC 1 wk for next dose or PRN. Pt verbalized 
understanding.

Lissy Golden LVN  3/6/2020  4:17 PM



Electronically signed by Lissy Oleary LVN at 2020  4:17 PM 
CSTdocumented in this encounter



Plan of Treatment







 Date  Type  Specialty  Care Team  Description

 

 2020  Routine Prenatal Visit  [facility]  Ami Alcala, FNP



  



       [address]



  



       [place] [address]



  



       [phone] (Fax)  









 Health Maintenance  Due Date  Last Done  Comments

 

 INFLUENZA VACCINE (#1)  2020    Postponed from



       2019 (Refused)

 

 PAP SMEAR  08/15/2022  08/15/2019, 2017,  



     2014, Additional  



     history exists  

 

 DTaP,Tdap,and Td Vaccines  2030, 2010  



 (2 - Td)      

 

 PNEUMOCOCCAL 0-64 YEARS  Aged Out    No longer eligible



 COMBINED SERIES      based on patient's age



       to complete this topic



documented as of this encounter



Results

Not on filedocumented in this encounter



Visit Diagnoses







 Diagnosis

 

 History of  labor - Primary







 Personal history of pre-term labor



documented in this encounter



Administered Medications







 Medication Order  MAR Action  Action Date  Dose  Rate  Site

 

 HYDROXYprogest(PF)(preg  Given  2020  4:18 PM  250 mg    Right Upper 
Quad.



 presv) (JAYNA) 250    CST      Gluteus



 mg/mL (1 mL) injection          



 250 mg



          



 250 mg, Intramuscular,          



 QWEEKLY, 14 doses,          



 First dose on 19 at 1200, Last          



 dose on Mon 3/2/20 at          



 1200, Routine          









 Given  2020 11:19 AM CST  250 mg    Left Dorsogluteal-IM

 

 Given  2020  9:27 AM CST  250 mg    Right Upper Quad. Gluteus









   



documented in this encounter



Insurance







 Payer  Benefit Plan /  Subscriber ID  Effective  Phone  Address  Type



   Group    Dates      

 

 [facility] [place] [place]  xxxxxxxxx  10/1/2019-Pres    [address]  Medicaid



 HEALTH CHOICE -  HEALTH CHOICE    [facility]    [id number]



  



 MANAGED MEDICAID [place], [place]          [id number]  









 Guarantor Name  Account Type  Relation to  Date of  Phone  Billing



     Patient  Birth    Address

 

 Mellissa Tobar  Personal/Family  Self  1990  [phone]  [address] [place] [address] [address]        (Home)  [place] [address]



documented as of this encounter



Advance Directives







 Name  Relationship  Healthcare Agent Relationship  Communication

 

 Gerhard Harrell  Primary healthcare agent  [phone]



       (Mobile)[phone] (Home)

## 2020-03-26 LAB — RPR SER QL: (no result)

## 2020-03-26 RX ADMIN — IBUPROFEN PRN MG: 600 TABLET ORAL at 12:19

## 2020-03-26 NOTE — OP
Surgeon:  Gm Caballero MD



Ms. Tobar is a 30-year-old  female,  6, para 0-1-3-1, now at approximately 38 weeks' 
gestation followed through [facility].  She presents with rupture of membranes noted to be approxima
tely 1 cm dilated with mild irregular contractions.  She was admitted, begun on Pitocin augmentation 
of labor.  She had a first stage of labor approximately 6 hours, second stage of labor 13 minutes.  S
he delivered by spontaneous controlled vaginal delivery a 6-pound 1-ounce female infant.  After delay
ed cord clamping, the infant was placed on mother's upper abdomen.  Cord blood obtained.  Placenta wa
s spontaneously expelled and appeared to be intact.  She has suffered a second-degree midline lacerat
ion, which was repaired in the usual fashion with 3-0 Vicryl suture.  She received 1 mg of Stadol and
 1 dose of Phenergan for analgesia prior to placement of epidural catheter.  Quantitative blood loss 
was 172 mL.





TANK/ROMULO

DD:  2020 07:59:38Voice ID:  [id number]

DT:  2020 08:24:14Report ID:  [id number]

## 2020-03-26 NOTE — DS
Hospital Discharge Diagnoses:  

1.38 week pregnancy, delivered.

2.Iron-deficiency anemia.



Complications:  None.



Procedures:  Pitocin augmentation of labor, placement of epidural catheter, spontaneous controlled va
ginal delivery of viable female infant, repair of second-degree perineal laceration.



Hospital Course:  The patient is a 30-year-old  female, admitted with spontaneous rupture of
 membranes and not in active labor.  She had an uneventful labor and delivery of a 6 pounds 1-ounce f
emale infant, Apgar 8 and 9.  She was dismissed on the first postpartum day, ambulatory, on a select 
diet with routine postvaginal delivery activity restrictions, to be seen back through the [facility]
.  She was dismissed with prescription for Tylenol No. 3 #5 tablets, to continue taking her prenatal 
vitamins, to take an iron supplement also for the next month or 2.  Lab work obtained during this hos
pital stay included an admission hemoglobin and hematocrit of 10.3 and 29.9, dismissal hematocrit of 
27.0.  She had a positive urine test for marijuana.  Nonreactive RPR.  Cord blood type was Rh negativ
e, so she did not receive RhoGAM.





TANK/ROMULO

DD:  2020 08:17:02Voice ID:  [id number]

DT:  2020 08:30:30Report ID:  [id number]

## 2020-03-27 VITALS — SYSTOLIC BLOOD PRESSURE: 104 MMHG | TEMPERATURE: 97.8 F | DIASTOLIC BLOOD PRESSURE: 66 MMHG
